# Patient Record
Sex: FEMALE | Race: BLACK OR AFRICAN AMERICAN | Employment: UNEMPLOYED | URBAN - METROPOLITAN AREA
[De-identification: names, ages, dates, MRNs, and addresses within clinical notes are randomized per-mention and may not be internally consistent; named-entity substitution may affect disease eponyms.]

---

## 2018-11-15 ENCOUNTER — APPOINTMENT (OUTPATIENT)
Dept: ULTRASOUND IMAGING | Age: 63
DRG: 045 | End: 2018-11-15
Attending: INTERNAL MEDICINE
Payer: COMMERCIAL

## 2018-11-15 ENCOUNTER — HOSPITAL ENCOUNTER (INPATIENT)
Age: 63
LOS: 6 days | Discharge: HOME HEALTH | DRG: 045 | End: 2018-11-21
Attending: EMERGENCY MEDICINE | Admitting: INTERNAL MEDICINE
Payer: COMMERCIAL

## 2018-11-15 ENCOUNTER — APPOINTMENT (OUTPATIENT)
Dept: GENERAL RADIOLOGY | Age: 63
DRG: 045 | End: 2018-11-15
Attending: INTERNAL MEDICINE
Payer: COMMERCIAL

## 2018-11-15 ENCOUNTER — APPOINTMENT (OUTPATIENT)
Dept: CT IMAGING | Age: 63
DRG: 045 | End: 2018-11-15
Attending: EMERGENCY MEDICINE
Payer: COMMERCIAL

## 2018-11-15 ENCOUNTER — APPOINTMENT (OUTPATIENT)
Dept: MRI IMAGING | Age: 63
DRG: 045 | End: 2018-11-15
Attending: EMERGENCY MEDICINE
Payer: COMMERCIAL

## 2018-11-15 DIAGNOSIS — Z72.0 TOBACCO USE: Chronic | ICD-10-CM

## 2018-11-15 DIAGNOSIS — I63.9 CEREBROVASCULAR ACCIDENT (CVA), UNSPECIFIED MECHANISM (HCC): Primary | ICD-10-CM

## 2018-11-15 DIAGNOSIS — I10 ESSENTIAL HYPERTENSION: Chronic | ICD-10-CM

## 2018-11-15 PROBLEM — D72.819 LEUKOPENIA: Status: ACTIVE | Noted: 2018-11-15

## 2018-11-15 PROBLEM — E87.1 HYPONATREMIA: Status: ACTIVE | Noted: 2018-11-15

## 2018-11-15 LAB
ALBUMIN SERPL-MCNC: 3.7 G/DL (ref 3.2–4.6)
ALBUMIN/GLOB SERPL: 0.8 {RATIO} (ref 1.2–3.5)
ALP SERPL-CCNC: 118 U/L (ref 50–136)
ALT SERPL-CCNC: 23 U/L (ref 12–65)
ANION GAP SERPL CALC-SCNC: 8 MMOL/L (ref 7–16)
AST SERPL-CCNC: 17 U/L (ref 15–37)
ATRIAL RATE: 84 BPM
BASOPHILS # BLD: 0 K/UL (ref 0–0.2)
BASOPHILS NFR BLD: 1 % (ref 0–2)
BILIRUB SERPL-MCNC: 0.6 MG/DL (ref 0.2–1.1)
BUN SERPL-MCNC: 18 MG/DL (ref 8–23)
CALCIUM SERPL-MCNC: 9 MG/DL (ref 8.3–10.4)
CALCULATED P AXIS, ECG09: 65 DEGREES
CALCULATED R AXIS, ECG10: -19 DEGREES
CALCULATED T AXIS, ECG11: 53 DEGREES
CHLORIDE SERPL-SCNC: 100 MMOL/L (ref 98–107)
CO2 SERPL-SCNC: 23 MMOL/L (ref 21–32)
CREAT SERPL-MCNC: 1.04 MG/DL (ref 0.6–1)
DIAGNOSIS, 93000: NORMAL
DIFFERENTIAL METHOD BLD: ABNORMAL
EOSINOPHIL # BLD: 0.1 K/UL (ref 0–0.8)
EOSINOPHIL NFR BLD: 3 % (ref 0.5–7.8)
ERYTHROCYTE [DISTWIDTH] IN BLOOD BY AUTOMATED COUNT: 13.4 %
GLOBULIN SER CALC-MCNC: 4.4 G/DL (ref 2.3–3.5)
GLUCOSE SERPL-MCNC: 98 MG/DL (ref 65–100)
HCT VFR BLD AUTO: 42.6 % (ref 35.8–46.3)
HGB BLD-MCNC: 13.8 G/DL (ref 11.7–15.4)
IMM GRANULOCYTES # BLD: 0 K/UL (ref 0–0.5)
IMM GRANULOCYTES NFR BLD AUTO: 1 % (ref 0–5)
LYMPHOCYTES # BLD: 0.9 K/UL (ref 0.5–4.6)
LYMPHOCYTES NFR BLD: 23 % (ref 13–44)
MCH RBC QN AUTO: 30 PG (ref 26.1–32.9)
MCHC RBC AUTO-ENTMCNC: 32.4 G/DL (ref 31.4–35)
MCV RBC AUTO: 92.6 FL (ref 79.6–97.8)
MONOCYTES # BLD: 0.5 K/UL (ref 0.1–1.3)
MONOCYTES NFR BLD: 14 % (ref 4–12)
NEUTS SEG # BLD: 2.2 K/UL (ref 1.7–8.2)
NEUTS SEG NFR BLD: 58 % (ref 43–78)
NRBC # BLD: 0 K/UL (ref 0–0.2)
P-R INTERVAL, ECG05: 136 MS
PLATELET # BLD AUTO: 292 K/UL (ref 150–450)
PMV BLD AUTO: 9.2 FL (ref 9.4–12.3)
POTASSIUM SERPL-SCNC: 3.8 MMOL/L (ref 3.5–5.1)
PROT SERPL-MCNC: 8.1 G/DL (ref 6.3–8.2)
Q-T INTERVAL, ECG07: 398 MS
QRS DURATION, ECG06: 70 MS
QTC CALCULATION (BEZET), ECG08: 470 MS
RBC # BLD AUTO: 4.6 M/UL (ref 4.05–5.2)
SODIUM SERPL-SCNC: 131 MMOL/L (ref 136–145)
VENTRICULAR RATE, ECG03: 84 BPM
WBC # BLD AUTO: 3.7 K/UL (ref 4.3–11.1)

## 2018-11-15 PROCEDURE — 74011250636 HC RX REV CODE- 250/636: Performed by: INTERNAL MEDICINE

## 2018-11-15 PROCEDURE — 70551 MRI BRAIN STEM W/O DYE: CPT

## 2018-11-15 PROCEDURE — 80053 COMPREHEN METABOLIC PANEL: CPT

## 2018-11-15 PROCEDURE — 93880 EXTRACRANIAL BILAT STUDY: CPT

## 2018-11-15 PROCEDURE — 74011250637 HC RX REV CODE- 250/637: Performed by: INTERNAL MEDICINE

## 2018-11-15 PROCEDURE — 70450 CT HEAD/BRAIN W/O DYE: CPT

## 2018-11-15 PROCEDURE — 65660000000 HC RM CCU STEPDOWN

## 2018-11-15 PROCEDURE — 85025 COMPLETE CBC W/AUTO DIFF WBC: CPT

## 2018-11-15 PROCEDURE — 71045 X-RAY EXAM CHEST 1 VIEW: CPT

## 2018-11-15 PROCEDURE — 99284 EMERGENCY DEPT VISIT MOD MDM: CPT | Performed by: EMERGENCY MEDICINE

## 2018-11-15 PROCEDURE — 93005 ELECTROCARDIOGRAM TRACING: CPT | Performed by: EMERGENCY MEDICINE

## 2018-11-15 RX ORDER — SODIUM CHLORIDE 0.9 % (FLUSH) 0.9 %
5-10 SYRINGE (ML) INJECTION EVERY 8 HOURS
Status: DISCONTINUED | OUTPATIENT
Start: 2018-11-15 | End: 2018-11-21 | Stop reason: HOSPADM

## 2018-11-15 RX ORDER — HYDRALAZINE HYDROCHLORIDE 20 MG/ML
10 INJECTION INTRAMUSCULAR; INTRAVENOUS
Status: DISCONTINUED | OUTPATIENT
Start: 2018-11-15 | End: 2018-11-21 | Stop reason: HOSPADM

## 2018-11-15 RX ORDER — IBUPROFEN 200 MG
1 TABLET ORAL DAILY
Status: DISCONTINUED | OUTPATIENT
Start: 2018-11-15 | End: 2018-11-21 | Stop reason: HOSPADM

## 2018-11-15 RX ORDER — ACETAMINOPHEN 325 MG/1
650 TABLET ORAL
Status: DISCONTINUED | OUTPATIENT
Start: 2018-11-15 | End: 2018-11-21 | Stop reason: HOSPADM

## 2018-11-15 RX ORDER — ALBUTEROL SULFATE 0.83 MG/ML
2.5 SOLUTION RESPIRATORY (INHALATION)
Status: DISCONTINUED | OUTPATIENT
Start: 2018-11-15 | End: 2018-11-18

## 2018-11-15 RX ORDER — ENOXAPARIN SODIUM 100 MG/ML
40 INJECTION SUBCUTANEOUS EVERY 24 HOURS
Status: DISCONTINUED | OUTPATIENT
Start: 2018-11-16 | End: 2018-11-21 | Stop reason: HOSPADM

## 2018-11-15 RX ORDER — SODIUM CHLORIDE 0.9 % (FLUSH) 0.9 %
5-10 SYRINGE (ML) INJECTION AS NEEDED
Status: DISCONTINUED | OUTPATIENT
Start: 2018-11-15 | End: 2018-11-21 | Stop reason: HOSPADM

## 2018-11-15 RX ORDER — GUAIFENESIN 100 MG/5ML
81 LIQUID (ML) ORAL DAILY
Status: DISCONTINUED | OUTPATIENT
Start: 2018-11-16 | End: 2018-11-21 | Stop reason: HOSPADM

## 2018-11-15 RX ORDER — SODIUM CHLORIDE 9 MG/ML
75 INJECTION, SOLUTION INTRAVENOUS CONTINUOUS
Status: DISCONTINUED | OUTPATIENT
Start: 2018-11-15 | End: 2018-11-18

## 2018-11-15 RX ORDER — ATORVASTATIN CALCIUM 40 MG/1
40 TABLET, FILM COATED ORAL
Status: DISCONTINUED | OUTPATIENT
Start: 2018-11-15 | End: 2018-11-21 | Stop reason: HOSPADM

## 2018-11-15 RX ORDER — ONDANSETRON 2 MG/ML
4 INJECTION INTRAMUSCULAR; INTRAVENOUS
Status: DISCONTINUED | OUTPATIENT
Start: 2018-11-15 | End: 2018-11-21 | Stop reason: HOSPADM

## 2018-11-15 RX ADMIN — HYDRALAZINE HYDROCHLORIDE 10 MG: 20 INJECTION INTRAMUSCULAR; INTRAVENOUS at 21:31

## 2018-11-15 NOTE — ED PROVIDER NOTES
Patient last at her normal baseline on Monday of this week. Since that time she has had difficulty walking and has weakness to the right side. No history of associated speech or vision change. No headache. Had a fall after these changes on yesterday. No fall related injury. Patient continues to feel knee issue is \"gout\" but no pain to move/swelling or soreness on palpation /motion 
 
 
*patient has learning disabilities and history is somewhat compromised by this. The history is provided by the patient and a relative. Knee Problem This is a new problem. The pain is present in the right knee. Pertinent negatives include no numbness, full range of motion and no tingling. Extremity Weakness This is a new problem. The current episode started 2 days ago. The problem occurs constantly. The problem has not changed since onset. Pertinent negatives include no numbness, full range of motion and no tingling. She has tried nothing for the symptoms. There has been no history of extremity trauma. No past medical history on file. No past surgical history on file. No family history on file. Social History Socioeconomic History  Marital status: SINGLE Spouse name: Not on file  Number of children: Not on file  Years of education: Not on file  Highest education level: Not on file Social Needs  Financial resource strain: Not on file  Food insecurity - worry: Not on file  Food insecurity - inability: Not on file  Transportation needs - medical: Not on file  Transportation needs - non-medical: Not on file Occupational History  Not on file Tobacco Use  Smoking status: Not on file Substance and Sexual Activity  Alcohol use: Not on file  Drug use: Not on file  Sexual activity: Not on file Other Topics Concern  Not on file Social History Narrative  Not on file ALLERGIES: Patient has no known allergies. Review of Systems Constitutional: Negative for chills and fever. HENT: Negative. Eyes: Negative for visual disturbance. Respiratory: Negative. Cardiovascular: Negative. Genitourinary: Negative. Neurological: Positive for weakness. Negative for tingling, speech difficulty and numbness. All other systems reviewed and are negative. Vitals:  
 11/15/18 1040 BP: (!) 160/102 Pulse: 93 Resp: 18 Temp: 97.8 °F (36.6 °C) SpO2: 98% Physical Exam  
Constitutional: She appears well-developed and well-nourished. No distress. HENT:  
Head: Atraumatic. Right Ear: External ear normal.  
Left Ear: External ear normal.  
Nose: Nose normal.  
Mouth/Throat: Oropharynx is clear and moist.  
Slight probable right facial weakness Eyes: EOM are normal. Pupils are equal, round, and reactive to light. No scleral icterus. Neck: Neck supple. Cardiovascular: Normal rate. Pulmonary/Chest: Effort normal. No respiratory distress. Abdominal: Soft. Musculoskeletal: Normal range of motion. Neurological: No sensory deficit. She exhibits abnormal muscle tone. Right arm drift Some right facial weakness Skin: Skin is warm and dry. No erythema. No pallor. Psychiatric: She has a normal mood and affect. Thought content normal.  
Nursing note and vitals reviewed. MDM Number of Diagnoses or Management Options Diagnosis management comments: MRI identified small right parietal infarct. Patient difficult historian and issues at least 2 days per family who give additional hx Amount and/or Complexity of Data Reviewed Clinical lab tests: ordered and reviewed Tests in the radiology section of CPT®: reviewed and ordered Obtain history from someone other than the patient: yes Discuss the patient with other providers: yes Independent visualization of images, tracings, or specimens: yes Risk of Complications, Morbidity, and/or Mortality Presenting problems: high Diagnostic procedures: low Management options: moderate Patient Progress Patient progress: stable Procedures Study Result MRI of the brain  
  
INDICATION:  Right arm and hand weakness 
  
Standard MRI sequences were obtained through the brain in multiple planes 
without IV contrast 
  
FINDINGS: 
There is a small acute lacunar infarcts in the left corona radiata. There is no 
associated hemorrhage. No other areas of acute infarction are seen. There is 
an old lacunar infarct in the right midbrain. There is moderate chronic change 
in the cerebellum and periventricular white matter. .  The ventricles are normal 
in size. There are no extra-axial fluid collections. There are no intracranial 
masses. There are no bony lesions. The sinuses are clear. 
  
IMPRESSION IMPRESSION: Small acute left parietal white matter infarct. No evidence of 
hemorrhage. 
   
 
MRI BRAIN WO CONT Final Result IMPRESSION: Small acute left parietal white matter infarct. No evidence of  
hemorrhage. CT HEAD WO CONT Final Result IMPRESSION:  Negative for acute intracranial hemorrhage. Chronic changes. MRI  
is more sensitive for acute infarct. Recent Results (from the past 12 hour(s)) CBC WITH AUTOMATED DIFF Collection Time: 11/15/18 12:06 PM  
Result Value Ref Range WBC 3.7 (L) 4.3 - 11.1 K/uL  
 RBC 4.60 4.05 - 5.2 M/uL  
 HGB 13.8 11.7 - 15.4 g/dL HCT 42.6 35.8 - 46.3 % MCV 92.6 79.6 - 97.8 FL  
 MCH 30.0 26.1 - 32.9 PG  
 MCHC 32.4 31.4 - 35.0 g/dL  
 RDW 13.4 % PLATELET 367 625 - 848 K/uL MPV 9.2 (L) 9.4 - 12.3 FL ABSOLUTE NRBC 0.00 0.0 - 0.2 K/uL  
 DF AUTOMATED NEUTROPHILS 58 43 - 78 % LYMPHOCYTES 23 13 - 44 % MONOCYTES 14 (H) 4.0 - 12.0 % EOSINOPHILS 3 0.5 - 7.8 % BASOPHILS 1 0.0 - 2.0 % IMMATURE GRANULOCYTES 1 0.0 - 5.0 %  
 ABS. NEUTROPHILS 2.2 1.7 - 8.2 K/UL  
 ABS. LYMPHOCYTES 0.9 0.5 - 4.6 K/UL  
 ABS. MONOCYTES 0.5 0.1 - 1.3 K/UL ABS. EOSINOPHILS 0.1 0.0 - 0.8 K/UL  
 ABS. BASOPHILS 0.0 0.0 - 0.2 K/UL  
 ABS. IMM. GRANS. 0.0 0.0 - 0.5 K/UL METABOLIC PANEL, COMPREHENSIVE Collection Time: 11/15/18 12:06 PM  
Result Value Ref Range Sodium 131 (L) 136 - 145 mmol/L Potassium 3.8 3.5 - 5.1 mmol/L Chloride 100 98 - 107 mmol/L  
 CO2 23 21 - 32 mmol/L Anion gap 8 7 - 16 mmol/L Glucose 98 65 - 100 mg/dL BUN 18 8 - 23 MG/DL Creatinine 1.04 (H) 0.6 - 1.0 MG/DL  
 GFR est AA >60 >60 ml/min/1.73m2 GFR est non-AA 57 (L) >60 ml/min/1.73m2 Calcium 9.0 8.3 - 10.4 MG/DL Bilirubin, total 0.6 0.2 - 1.1 MG/DL  
 ALT (SGPT) 23 12 - 65 U/L  
 AST (SGOT) 17 15 - 37 U/L Alk. phosphatase 118 50 - 136 U/L Protein, total 8.1 6.3 - 8.2 g/dL Albumin 3.7 3.2 - 4.6 g/dL Globulin 4.4 (H) 2.3 - 3.5 g/dL A-G Ratio 0.8 (L) 1.2 - 3.5 EKG, 12 LEAD, INITIAL Collection Time: 11/15/18 12:11 PM  
Result Value Ref Range Ventricular Rate 84 BPM  
 Atrial Rate 84 BPM  
 P-R Interval 136 ms QRS Duration 70 ms Q-T Interval 398 ms QTC Calculation (Bezet) 470 ms Calculated P Axis 65 degrees Calculated R Axis -19 degrees Calculated T Axis 53 degrees Diagnosis Normal sinus rhythm Possible Left atrial enlargement Anterior infarct , age undetermined Abnormal ECG No previous ECGs available Confirmed by Salinas Schulz MD (), Ana Luisa Anthony ([id number]) on 11/15/2018 4:54:32 PM

## 2018-11-15 NOTE — ED TRIAGE NOTES
Pt arrives to the ER stating right knee troubles for 3 days. Pt is able to walk on it during the day but states harder to move at night and this morning. Pt denies pain. No hx of gout, no injury or trauma. Pt able to move all extremities fine in triage

## 2018-11-15 NOTE — ED NOTES
Dr. Willian Polanco to triage to evaluate pt. No orders at this time, to be evaluated once pulled back to a room.

## 2018-11-16 LAB
ANION GAP SERPL CALC-SCNC: 8 MMOL/L (ref 7–16)
BASOPHILS # BLD: 0 K/UL (ref 0–0.2)
BASOPHILS NFR BLD: 1 % (ref 0–2)
BUN SERPL-MCNC: 19 MG/DL (ref 8–23)
CALCIUM SERPL-MCNC: 8.7 MG/DL (ref 8.3–10.4)
CHLORIDE SERPL-SCNC: 102 MMOL/L (ref 98–107)
CHOLEST SERPL-MCNC: 211 MG/DL
CO2 SERPL-SCNC: 24 MMOL/L (ref 21–32)
CREAT SERPL-MCNC: 0.86 MG/DL (ref 0.6–1)
DIFFERENTIAL METHOD BLD: NORMAL
EOSINOPHIL # BLD: 0 K/UL (ref 0–0.8)
EOSINOPHIL NFR BLD: 1 % (ref 0.5–7.8)
ERYTHROCYTE [DISTWIDTH] IN BLOOD BY AUTOMATED COUNT: 13.3 %
EST. AVERAGE GLUCOSE BLD GHB EST-MCNC: 114 MG/DL
GLUCOSE SERPL-MCNC: 97 MG/DL (ref 65–100)
HBA1C MFR BLD: 5.6 % (ref 4.8–6)
HCT VFR BLD AUTO: 37.7 % (ref 35.8–46.3)
HDLC SERPL-MCNC: 60 MG/DL (ref 40–60)
HDLC SERPL: 3.5 {RATIO}
HGB BLD-MCNC: 12.7 G/DL (ref 11.7–15.4)
IMM GRANULOCYTES # BLD: 0 K/UL (ref 0–0.5)
IMM GRANULOCYTES NFR BLD AUTO: 0 % (ref 0–5)
LDLC SERPL CALC-MCNC: 141.6 MG/DL
LIPID PROFILE,FLP: ABNORMAL
LYMPHOCYTES # BLD: 1 K/UL (ref 0.5–4.6)
LYMPHOCYTES NFR BLD: 18 % (ref 13–44)
MCH RBC QN AUTO: 30.2 PG (ref 26.1–32.9)
MCHC RBC AUTO-ENTMCNC: 33.7 G/DL (ref 31.4–35)
MCV RBC AUTO: 89.8 FL (ref 79.6–97.8)
MONOCYTES # BLD: 0.6 K/UL (ref 0.1–1.3)
MONOCYTES NFR BLD: 11 % (ref 4–12)
NEUTS SEG # BLD: 3.6 K/UL (ref 1.7–8.2)
NEUTS SEG NFR BLD: 69 % (ref 43–78)
NRBC # BLD: 0 K/UL (ref 0–0.2)
PLATELET # BLD AUTO: 310 K/UL (ref 150–450)
PMV BLD AUTO: 9.6 FL (ref 9.4–12.3)
POTASSIUM SERPL-SCNC: 3.3 MMOL/L (ref 3.5–5.1)
RBC # BLD AUTO: 4.2 M/UL (ref 4.05–5.2)
SODIUM SERPL-SCNC: 134 MMOL/L (ref 136–145)
TRIGL SERPL-MCNC: 47 MG/DL (ref 35–150)
VLDLC SERPL CALC-MCNC: 9.4 MG/DL (ref 6–23)
WBC # BLD AUTO: 5.3 K/UL (ref 4.3–11.1)

## 2018-11-16 PROCEDURE — 77030020263 HC SOL INJ SOD CL0.9% LFCR 1000ML

## 2018-11-16 PROCEDURE — 74011250637 HC RX REV CODE- 250/637: Performed by: INTERNAL MEDICINE

## 2018-11-16 PROCEDURE — 36415 COLL VENOUS BLD VENIPUNCTURE: CPT

## 2018-11-16 PROCEDURE — 92610 EVALUATE SWALLOWING FUNCTION: CPT

## 2018-11-16 PROCEDURE — 80048 BASIC METABOLIC PNL TOTAL CA: CPT

## 2018-11-16 PROCEDURE — 97162 PT EVAL MOD COMPLEX 30 MIN: CPT

## 2018-11-16 PROCEDURE — 83036 HEMOGLOBIN GLYCOSYLATED A1C: CPT

## 2018-11-16 PROCEDURE — 74011250636 HC RX REV CODE- 250/636: Performed by: INTERNAL MEDICINE

## 2018-11-16 PROCEDURE — 65660000000 HC RM CCU STEPDOWN

## 2018-11-16 PROCEDURE — 86580 TB INTRADERMAL TEST: CPT | Performed by: INTERNAL MEDICINE

## 2018-11-16 PROCEDURE — 85025 COMPLETE CBC W/AUTO DIFF WBC: CPT

## 2018-11-16 PROCEDURE — 97530 THERAPEUTIC ACTIVITIES: CPT

## 2018-11-16 PROCEDURE — C8929 TTE W OR WO FOL WCON,DOPPLER: HCPCS

## 2018-11-16 PROCEDURE — 97165 OT EVAL LOW COMPLEX 30 MIN: CPT

## 2018-11-16 PROCEDURE — 99253 IP/OBS CNSLTJ NEW/EST LOW 45: CPT | Performed by: PHYSICAL MEDICINE & REHABILITATION

## 2018-11-16 PROCEDURE — 74011000302 HC RX REV CODE- 302: Performed by: INTERNAL MEDICINE

## 2018-11-16 PROCEDURE — 74011000250 HC RX REV CODE- 250: Performed by: INTERNAL MEDICINE

## 2018-11-16 PROCEDURE — 80061 LIPID PANEL: CPT

## 2018-11-16 PROCEDURE — 97161 PT EVAL LOW COMPLEX 20 MIN: CPT

## 2018-11-16 RX ORDER — AMLODIPINE BESYLATE 5 MG/1
5 TABLET ORAL DAILY
Status: DISCONTINUED | OUTPATIENT
Start: 2018-11-17 | End: 2018-11-18

## 2018-11-16 RX ORDER — POTASSIUM CHLORIDE 1.5 G/1.77G
40 POWDER, FOR SOLUTION ORAL ONCE
Status: COMPLETED | OUTPATIENT
Start: 2018-11-16 | End: 2018-11-16

## 2018-11-16 RX ADMIN — POTASSIUM CHLORIDE 40 MEQ: 1.5 POWDER, FOR SOLUTION ORAL at 17:04

## 2018-11-16 RX ADMIN — HYDRALAZINE HYDROCHLORIDE 10 MG: 20 INJECTION INTRAMUSCULAR; INTRAVENOUS at 20:52

## 2018-11-16 RX ADMIN — HYDRALAZINE HYDROCHLORIDE 10 MG: 20 INJECTION INTRAMUSCULAR; INTRAVENOUS at 08:25

## 2018-11-16 RX ADMIN — ATORVASTATIN CALCIUM 40 MG: 40 TABLET, FILM COATED ORAL at 00:35

## 2018-11-16 RX ADMIN — ATORVASTATIN CALCIUM 40 MG: 40 TABLET, FILM COATED ORAL at 20:51

## 2018-11-16 RX ADMIN — Medication 10 ML: at 20:54

## 2018-11-16 RX ADMIN — PERFLUTREN 1 ML: 6.52 INJECTION, SUSPENSION INTRAVENOUS at 10:00

## 2018-11-16 RX ADMIN — Medication 5 ML: at 00:40

## 2018-11-16 RX ADMIN — ACETAMINOPHEN 650 MG: 325 TABLET, FILM COATED ORAL at 20:51

## 2018-11-16 RX ADMIN — ENOXAPARIN SODIUM 40 MG: 40 INJECTION, SOLUTION INTRAVENOUS; SUBCUTANEOUS at 08:25

## 2018-11-16 RX ADMIN — SODIUM CHLORIDE 75 ML/HR: 900 INJECTION, SOLUTION INTRAVENOUS at 00:36

## 2018-11-16 RX ADMIN — Medication 10 ML: at 14:00

## 2018-11-16 RX ADMIN — ASPIRIN 81 MG 81 MG: 81 TABLET ORAL at 08:12

## 2018-11-16 RX ADMIN — TUBERCULIN PURIFIED PROTEIN DERIVATIVE 5 UNITS: 5 INJECTION, SOLUTION INTRADERMAL at 00:46

## 2018-11-16 NOTE — ED NOTES
Report received from [facility] to assume patient care at this time. Patient is in MRI At this time

## 2018-11-16 NOTE — PROGRESS NOTES
Problem: Self Care Deficits Care Plan (Adult) Goal: *Acute Goals and Plan of Care (Insert Text) 1. Patient will feed self entire meal with setup and adaptive utensils as needed. 2. Patient will complete total body dressing and bathing with minimal assistance and adaptive equipment as needed. 3. Patient will participate in BUE therapeutic exercises to increase strength in RUE to at least 2/5 for participation in ADLs and functional transfers. 4. Patient will participate in [address] [place] [address] therapeutic activities to increase coordination in RUE to [facility] for bimanual fine motor ADLs. 5. Patient will attend to right side for 100% of treatment session with no verbal cues from therapist.  
6. Patient will attempt standing in preparation for functional transfers. Timeframe: 7 visits OCCUPATIONAL THERAPY: Initial Assessment, Daily Note and Treatment Day: 1st 11/16/2018INPATIENT: Hospital Day: 2 Payor: Janelle / Plan: [address] / Product Type: Managed Care Medicaid /  
  
NAME/AGE/GENDER: Uvaldo Mahajan is a 61 y.o. female PRIMARY DIAGNOSIS:  CVA (cerebral vascular accident) [facility]) CVA (cerebral vascular accident) ([facility] [address] CVA (cerebral vascular accident) ([facility] [address] ICD-10: Treatment Diagnosis:  
 · Generalized Muscle Weakness (M62.81) · Other lack of cordination (R27.8) Precautions/Allergies: 
   Patient has no known allergies. ASSESSMENT:  
Ms. Loren Camejo is a 61year old female admitted with R sided weakness, MRI showed L parietal CVA. At baseline patient lives with her aunt and is typically independent with ADLs. Patient supine in bed upon arrival, agreeable to OT evaluation. Reports no pain. BUE assessment reveals LUE WNL for ROM and strength 5/5. RUE 0/5 strength although sensation appears intact to light touch and patient does have awareness of RUE. Unfortunately she is R hand dominant.  Treatment initiated to include rolling in supine with maximal assistance R<-->L and supine to sit with maximal assistance x2. Worked on sitting balance and holding body and neck at midline. Did not attempt standing this date due to RLE weakness. Patient able to scoot self in bed using primarily L strong side. Instructed patient on proper positioning of RUE to prevent future complications. She is currently functioning below her independent baseline and would benefit from continued occupational therapy to increase independence and safety. Will follow. This section established at most recent assessment PROBLEM LIST (Impairments causing functional limitations): 1. Decreased Strength 2. Decreased ADL/Functional Activities 3. Decreased Transfer Abilities 4. Decreased Ambulation Ability/Technique 5. Decreased Balance 6. Decreased Activity Tolerance INTERVENTIONS PLANNED: (Benefits and precautions of occupational therapy have been discussed with the patient.) 1. Activities of daily living training 2. Adaptive equipment training 3. Donning&doffing training 4. Group therapy 5. Deon tech training 6. Neuromuscular re-eduation 7. Therapeutic activity 8. Therapeutic exercise TREATMENT PLAN: Frequency/Duration: Follow patient 3x/ week to address above goals. Rehabilitation Potential For Stated Goals: Good RECOMMENDED REHABILITATION/EQUIPMENT: (at time of discharge pending progress): Due to the probability of continued deficits (see above) this patient will likely need continued skilled occupational therapy after discharge. Equipment: ? TBD  
    
 
 
 
OCCUPATIONAL PROFILE AND HISTORY:  
History of Present Injury/Illness (Reason for Referral): 
Per H&P,   
Ms. Ginny Almazan is a 60 yo female with PMH of HTN, tobacco use who is evaluated with complaints of right sided weakness. She feels dizzy with ambulation and has had several falls. Stopped antihypertensives per PCP. Admits to ongoing smoking. Denies speech changes. CT head negative but MRI brain shows left parietal CVA. Past Medical History/Comorbidities: Ms. Blondie Schilder  has no past medical history on file. Ms. Blondie Schilder  has no past surgical history on file. Social History/Living Environment:  
Home Environment: Private residence # Steps to Enter: 2 One/Two Story Residence: One story Living Alone: No 
Support Systems: Friends \ neighbors, Family member(s) Patient Expects to be Discharged to[de-identified] Private residence Current DME Used/Available at Home: Cane, straight Prior Level of Function/Work/Activity: 
Lives with aunt. Independent with ADLs. Dominant Side:  
      RIGHT Number of Personal Factors/Comorbidities that affect the Plan of Care: Brief history (0):  LOW COMPLEXITY ASSESSMENT OF OCCUPATIONAL PERFORMANCE[de-identified]  
Activities of Daily Living:  
Basic ADLs (From Assessment) Complex ADLs (From Assessment) Feeding: Moderate assistance Oral Facial Hygiene/Grooming: Moderate assistance Bathing: Moderate assistance Upper Body Dressing: Moderate assistance Lower Body Dressing: Maximum assistance Toileting: Moderate assistance Instrumental ADL Meal Preparation: Total assistance Homemaking: Total assistance Medication Management: Total assistance Financial Management: Total assistance Grooming/Bathing/Dressing Activities of Daily Living Cognitive Retraining Safety/Judgement: Fall prevention Toileting Toileting Assistance: Total assistance(dependent) Bed/Mat Mobility Rolling: Maximum assistance Supine to Sit: Maximum assistance;Assist x2 Sit to Supine: Maximum assistance;Assist x2 Sit to Stand: (NT) Scooting: Maximum assistance Most Recent Physical Functioning:  
Gross Assessment: 
AROM: Grossly decreased, non-functional(RUE) PROM: Within functional limits(BUE) Strength: Grossly decreased, non-functional(RUE 0/5, LUE 5/5) Coordination: Grossly decreased, non-functional(RUE ) Tone: Abnormal(RUE) Sensation: Intact(light touch and deep pressure BUE) Posture: 
  
Balance: 
Sitting: Impaired Sitting - Static: Poor (constant support) Sitting - Dynamic: Poor (constant support) Bed Mobility: 
Rolling: Maximum assistance Supine to Sit: Maximum assistance;Assist x2 Sit to Supine: Maximum assistance;Assist x2 Scooting: Maximum assistance Wheelchair Mobility: 
  
Transfers: 
Sit to Stand: (NT) Patient Vitals for the past 6 hrs: 
 BP BP Patient Position SpO2 Pulse 11/16/18 0800 (!) 176/108 At rest 91 % (!) 105 Mental Status Neurologic State: Alert Orientation Level: Oriented to person Cognition: Follows commands Perception: Cues to attend right visual field, Cues to attend to right side of body, Cues to maintain midline in sitting Perseveration: No perseveration noted Safety/Judgement: Fall prevention Physical Skills Involved: 1. Range of Motion 2. Balance 3. Strength 4. Activity Tolerance 5. Sensation 6. Fine Motor Control 7. Gross Motor Control Cognitive Skills Affected (resulting in the inability to perform in a timely and safe manner): 1. Expression Psychosocial Skills Affected: 1. Habits/Routines 2. Environmental Adaptation 3. Self-Awareness 4. Social Roles Number of elements that affect the Plan of Care: 5+:  HIGH COMPLEXITY CLINICAL DECISION MAKING:  
[facility] 6 Clicks Daily Activity Inpatient Short Form How much help from another person does the patient currently need. .. Total A Lot A Little None 1. Putting on and taking off regular lower body clothing? [] 1   [x] 2   [] 3   [] 4  
2. Bathing (including washing, rinsing, drying)? [] 1   [x] 2   [] 3   [] 4  
3. Toileting, which includes using toilet, bedpan or urinal?   [] 1   [x] 2   [] 3   [] 4  
4. Putting on and taking off regular upper body clothing?    [] 1   [x] 2 [] 3   [] 4  
5. Taking care of personal grooming such as brushing teeth? [] 1   [x] 2   [] 3   [] 4  
6. Eating meals? [] 1   [x] 2   [] 3   [] 4  
© 2007, Trustees of [facility], under license to [employer]. All rights reserved Score:  12 Most Recent: X (Date: -- ) Interpretation of Tool:  Represents activities that are increasingly more difficult (i.e. Bed mobility, Transfers, Gait). Score 24 23 22-20 19-15 14-10 9-7 6 Modifier CH CI CJ CK CL CM CN   
 
? Self Care:  
  - CURRENT STATUS: CL - 60%-79% impaired, limited or restricted  - GOAL STATUS: CK - 40%-59% impaired, limited or restricted  - D/C STATUS:  ---------------To be determined--------------- Payor: Janelle / Plan: [facility] / Product Type: Managed Care Medicaid /   
 
Medical NecessiInitial: ty:    
· Patient demonstrates good rehab potential due to higher previous functional level. Reason for Services/Other Comments: 
· Patient continues to require present interventions due to patient's inability to use dominant RUE for functional tasks. Use of outcome tool(s) and clinical judgement create a POC that gives a: MODERATE COMPLEXITY  
 
 
 
TREATMENT:  
(In addition to Assessment/Re-Assessment sessions the following treatments were rendered) Pre-treatment Symptoms/Complaints:   
Pain: Initial:  
Pain Intensity 1: 0  Post Session:  None Therapeutic Activity: (    8 minutes): Therapeutic activities including Bed transfers and rolling in supine and sitting edge of bed  to improve mobility, balance and proprioception. Required maximal   to promote dynamic balance in sitting. Braces/Orthotics/Lines/Etc:  
· IV 
· O2 Device: Room air Treatment/Session Assessment:   
· Response to Treatment:  Tolerated well · Interdisciplinary Collaboration:  
o Occupational Therapist 
o Registered Nurse · After treatment position/precautions:  
o Supine in bed o Bed alarm/tab alert on 
o Bed/Chair-wheels locked 
o Call light within reach 
o RN notified 
o Family at bedside 
o MD at bedside · Compliance with Program/Exercises: Compliant all of the time. · Recommendations/Intent for next treatment session: \"Next visit will focus on advancements to more challenging activities and reduction in assistance provided\". Total Treatment Duration: OT Patient Time In/Time Out Time In: 1017 Time Out: 2456 Napoleon P Zack OTR/L

## 2018-11-16 NOTE — PROGRESS NOTES
Am assessment completed, slurred speech noted and right side flaccid. No swallowing difficulty noted. Niece at bedside visiting and reports she is primary contact for patient

## 2018-11-16 NOTE — PROGRESS NOTES
Problem: Interdisciplinary Rounds Goal: Interdisciplinary Rounds Outcome: Progressing Towards Goal 
Interdisciplinary team rounds were held 11/16/2018 with the following team members:Care Management, Nurse Practitioner, Physical Therapy and . Referral to [facility]. Plan of care discussed. See clinical pathway and/or care plan for interventions and desired outcomes.

## 2018-11-16 NOTE — PROGRESS NOTES
Incontinent care provided, patient still has slurred speech, alert and oriented x4. No acute distress noted. VSS,right side is still flaccid. HOB 30 degrees, will cont to [place]

## 2018-11-16 NOTE — PROGRESS NOTES
Problem: Mobility Impaired (Adult and Pediatric) Goal: *Acute Goals and Plan of Care (Insert Text) STG: 
(1.)Ms. Blondie Schilder will move from supine to sit and sit to supine , scoot up and down and roll side to side with MODERATE ASSIST within 3 treatment day(s). (2.)Ms. Blondie Schilder will transfer from bed to chair and chair to bed with MAXIMAL ASSIST using the least restrictive device within 3 treatment day(s). (3.)Ms. Blondie Schilder will ambulate with MAXIMAL ASSIST for 10 feet with the least restrictive device within 3 treatment day(s). (4.)Ms. Blondie Schilder will maintain static/dynamic sitting x 12 minutes with at least FAIR balance for improved safety within 3 days. LTG: 
(1.)Ms. Blondie Schilder will move from supine to sit and sit to supine , scoot up and down and roll side to side in bed with MINIMAL ASSIST within 7 treatment day(s). (2.)Ms. Blondie Schilder will transfer from bed to chair and chair to bed with MINIMAL ASSIST using the least restrictive device within 7 treatment day(s). (3.)Ms. Blondie Schilder will ambulate with MINIMAL ASSIST for 50+ feet with the least restrictive device within 7 treatment day(s). (4.)Ms. Blondie Schilder will maintain static/dynamic sitting x 12 minutes with at least FAIR + balance for improved safety within 7 days. ________________________________________________________________________________________________ PHYSICAL THERAPY: Initial Assessment, Treatment Day: Day of Assessment, PM 11/16/2018INPATIENT: Hospital Day: 2 Payor: Janelle / Plan: [id number] / Product Type: Managed Care Medicaid /  
  
NAME/AGE/GENDER: Dmitriy Olson is a 61 y.o. female PRIMARY DIAGNOSIS: CVA (cerebral vascular accident) [facility]) CVA (cerebral vascular accident) ([facility] [address] CVA (cerebral vascular accident) ([facility] [address] ICD-10: Treatment Diagnosis:  
 · Generalized Muscle Weakness (M62.81) · Difficulty in walking, Not elsewhere classified (R26.2) · History of falling (Z91.81) Precaution/Allergies: Patient has no known allergies. ASSESSMENT:  
Ms. Mariano Nix is a 61 y.o. female in the hospital for the above who was supine in bed upon arrival.  Pt reports that she lives in a one story house with a friend that has 5 steps to enter. Pt also reported that PTA she was independent with ADLs and ambulated with independence. Pt admitted to one recent falls in the past year. Ms. Mariano Nix presents to PT with grossly decreased AROM and strength in R LE (0/5). Pt also presents with increased tone in R knee extensors. She reported intact sensation to light touch in B LEs. During evaluation pt performed supine to sit with modA x 2. She has fair to poor sitting balance and required maxA x 2 with gait belt for STS transfer. Pt demonstrated poor standing balance and was unable to maintain for long. Pt given maxA x 2 to get back in supine position. Of note pt is very motivated but slightly impulsive with decreased safety awareness. Ms. Mariano Nix could benefit from skilled PT as she is currently functioning below her baseline. In addition to evaluation pt received treatment consisting of therapeutic activity. She was given cues for rolling techniques and safe handling of R paretic arm. Pt required min-modA to roll based on direction going. Pt also given Dennis for scooting up in bed with use of L UE/LE. Pt benefited from treatment giving weakness in R side and decreased safety awareness. This section established at most recent assessment PROBLEM LIST (Impairments causing functional limitations): 1. Decreased Strength 2. Decreased ADL/Functional Activities 3. Decreased Transfer Abilities 4. Decreased Ambulation Ability/Technique 5. Decreased Balance INTERVENTIONS PLANNED: (Benefits and precautions of physical therapy have been discussed with the patient.) 1. Balance Exercise 2. Bed Mobility 3. Family Education 4. Gait Training 5. Neuromuscular Re-education/Strengthening 6. Therapeutic Activites 7. Therapeutic Exercise/Strengthening 8. Transfer Training TREATMENT PLAN: Frequency/Duration: 3 times a week for duration of hospital stay Rehabilitation Potential For Stated Goals: Good RECOMMENDED REHABILITATION/EQUIPMENT: (at time of discharge pending progress): Due to the probability of continued deficits (see above) this patient will likely need continued skilled physical therapy after discharge. Equipment:  
? None at this time HISTORY:  
History of Present Injury/Illness (Reason for Referral): 
See H&P Past Medical History/Comorbidities: Ms. Jolly Jacob  has no past medical history on file. Ms. Jolly Jacob  has no past surgical history on file. Social History/Living Environment:  
Home Environment: Private residence # Steps to Enter: 5 One/Two Story Residence: One story Living Alone: No 
Support Systems: Friends \ neighbors Patient Expects to be Discharged to[de-identified] Unknown Current DME Used/Available at Home: Cane, straight Tub or Shower Type: Tub/Shower combination Prior Level of Function/Work/Activity: 
Lives with friend in one story house and PTA pt was independent with ADLs and ambulation. One recent fall reported. Number of Personal Factors/Comorbidities that affect the Plan of Care: 0: LOW COMPLEXITY EXAMINATION:  
Most Recent Physical Functioning:  
Gross Assessment: 
AROM: Grossly decreased, non-functional(R LE) Strength: Grossly decreased, non-functional(R LE) Tone: Abnormal 
Sensation: Intact Posture: 
  
Balance: 
Sitting: Impaired Sitting - Static: Fair (occasional) Sitting - Dynamic: Poor (constant support) Standing: Impaired Standing - Static: Poor Standing - Dynamic : Poor Bed Mobility: 
Rolling: Minimum assistance; Moderate assistance Supine to Sit: Moderate assistance;Assist x2 Sit to Supine: Maximum assistance;Assist x2 Scooting: Minimum assistance Wheelchair Mobility: 
  
Transfers: 
Sit to Stand: Maximum assistance;Assist x2 
 Stand to Sit: Maximum assistance;Assist x2 Gait: 
  
   
  
Body Structures Involved: 1. Nerves 2. Muscles Body Functions Affected: 1. Neuromusculoskeletal 
2. Movement Related Activities and Participation Affected: 1. General Tasks and Demands 2. Mobility 3. Self Care 4. Domestic Life 5. Community, Social and [place] Number of elements that affect the Plan of Care: 4+: HIGH COMPLEXITY CLINICAL PRESENTATION:  
Presentation: Stable and uncomplicated: LOW COMPLEXITY CLINICAL DECISION MAKING:  
[facility] AM-PAC 6 Clicks Basic Mobility Inpatient Short Form How much difficulty does the patient currently have. .. Unable A Lot A Little None 1. Turning over in bed (including adjusting bedclothes, sheets and blankets)? [] 1   [] 2   [x] 3   [] 4  
2. Sitting down on and standing up from a chair with arms ( e.g., wheelchair, bedside commode, etc.)   [] 1   [x] 2   [] 3   [] 4  
3. Moving from lying on back to sitting on the side of the bed? [] 1   [x] 2   [] 3   [] 4 How much help from another person does the patient currently need. .. Total A Lot A Little None 4. Moving to and from a bed to a chair (including a wheelchair)? [] 1   [x] 2   [] 3   [] 4  
5. Need to walk in hospital room? [x] 1   [] 2   [] 3   [] 4  
6. Climbing 3-5 steps with a railing? [x] 1   [] 2   [] 3   [] 4  
© 2007, Trustees of [facility], under license to [employer]. All rights reserved Score:  Initial: 11 Most Recent: X (Date: -- ) Interpretation of Tool:  Represents activities that are increasingly more difficult (i.e. Bed mobility, Transfers, Gait). Score 24 23 22-20 19-15 14-10 9-7 6 Modifier CH CI CJ CK CL CM CN   
 
? Mobility - Walking and Moving Around:  
  - CURRENT STATUS: CL - 60%-79% impaired, limited or restricted  - GOAL STATUS: CK - 40%-59% impaired, limited or restricted   - D/C STATUS:  ---------------To be determined--------------- 
 Payor: [facility] / Plan: [address] [place] [address] Avenue / Product Type: Managed Care Medicaid /   
 
Medical Necessity:    
· Patient demonstrates good rehab potential due to higher previous functional level. Reason for Services/Other Comments: 
· Patient continues to require skilled intervention due to decreased functional mobility and balance. Use of outcome tool(s) and clinical judgement create a POC that gives a: Clear prediction of patient's progress: LOW COMPLEXITY  
  
 
 
 
TREATMENT:  
(In addition to Assessment/Re-Assessment sessions the following treatments were rendered) Pre-treatment Symptoms/Complaints:  None Pain: Initial:  
Pain Intensity 1: 0  Post Session:  0 Therapeutic Activity: (    8 minutes): Therapeutic activities including rolling each direction, scooting up in bed, and education on handling of R paretic arm to improve mobility, strength and coordination. Required minimal cues   to promote coordination of bilateral, upper extremity(s), lower extremity(s). Braces/Orthotics/Lines/Etc:  
· IV 
· O2 Device: Room air Treatment/Session Assessment:   
· Response to Treatment:  Tolerated well but impulsive. · Interdisciplinary Collaboration:  
o Physical Therapist 
o Registered Nurse 
o Rehabilitation Attendant 
o Certified Nursing Assistant/Patient Care Technician · After treatment position/precautions:  
o Supine in bed 
o Bed alarm/tab alert on 
o Bed/Chair-wheels locked 
o Bed in low position 
o Call light within reach 
o RN notified 
o Side rails x 3  
· Compliance with Program/Exercises: Will assess as treatment progresses · Recommendations/Intent for next treatment session: \"Next visit will focus on advancements to more challenging activities and reduction in assistance provided\". Total Treatment Duration: PT Patient Time In/Time Out Time In: 8991 Time Out: 3940 Andrez Lange, PT, DPT

## 2018-11-16 NOTE — PROGRESS NOTES
Progress Note 2018 Admit Date: 11/15/2018 11:00 AM  
NAME: Meaghan Randall :  1955 MRN:  [id number] Attending: Minerva Velazquez MD 
PCP:  None Treatment Team: Attending Provider: Minerva Velazquez MD; Consulting Provider: Zion Gomes MD; Charge Nurse: Nickolas Iyer Speech Language Pathologist: Manas Love; Utilization Review: Marcela Dean Full Code SUBJECTIVE:  
Ms. Jacquelin Castorena is a 60 yo female with PMH of HTN, tobacco abuse, who presented with c/o right sided weakness. She reported feeling dizzy on ambulation resulting in multiple falls. Her PCP stopped her anti-hypertensives. CT head neg for acute findings. MRI brain showed small acute left parietal white matter infarct. Carotid US shows extensive atherosclerosis in the carotid arteries bilaterally, no ultrasound evidence of significant carotid stenosis. Echo with EF 60-65%, no right to left shunt. A1C 5.6. . Pt started on ASA, Statin. Denies CP, SOB. No past medical history on file. Recent Results (from the past 24 hour(s)) LIPID PANEL Collection Time: 18  4:19 AM  
Result Value Ref Range LIPID PROFILE Cholesterol, total 211 (H) <200 MG/DL Triglyceride 47 35 - 150 MG/DL  
 HDL Cholesterol 60 40 - 60 MG/DL  
 LDL, calculated 141.6 (H) <100 MG/DL VLDL, calculated 9.4 6.0 - 23.0 MG/DL  
 CHOL/HDL Ratio 3.5 HEMOGLOBIN A1C WITH EAG Collection Time: 18  4:19 AM  
Result Value Ref Range Hemoglobin A1c 5.6 4.8 - 6.0 % Est. average glucose 114 mg/dL METABOLIC PANEL, BASIC Collection Time: 18  4:19 AM  
Result Value Ref Range Sodium 134 (L) 136 - 145 mmol/L Potassium 3.3 (L) 3.5 - 5.1 mmol/L Chloride 102 98 - 107 mmol/L  
 CO2 24 21 - 32 mmol/L Anion gap 8 7 - 16 mmol/L Glucose 97 65 - 100 mg/dL BUN 19 8 - 23 MG/DL Creatinine 0.86 0.6 - 1.0 MG/DL  
 GFR est AA >60 >60 ml/min/1.73m2 GFR est non-AA >60 >60 ml/min/1.73m2 Calcium 8.7 8.3 - 10.4 MG/DL  
CBC WITH AUTOMATED DIFF Collection Time: 11/16/18  4:19 AM  
Result Value Ref Range WBC 5.3 4.3 - 11.1 K/uL  
 RBC 4.20 4.05 - 5.2 M/uL  
 HGB 12.7 11.7 - 15.4 g/dL HCT 37.7 35.8 - 46.3 % MCV 89.8 79.6 - 97.8 FL  
 MCH 30.2 26.1 - 32.9 PG  
 MCHC 33.7 31.4 - 35.0 g/dL  
 RDW 13.3 % PLATELET 847 512 - 335 K/uL MPV 9.6 9.4 - 12.3 FL ABSOLUTE NRBC 0.00 0.0 - 0.2 K/uL  
 DF AUTOMATED NEUTROPHILS 69 43 - 78 % LYMPHOCYTES 18 13 - 44 % MONOCYTES 11 4.0 - 12.0 % EOSINOPHILS 1 0.5 - 7.8 % BASOPHILS 1 0.0 - 2.0 % IMMATURE GRANULOCYTES 0 0.0 - 5.0 %  
 ABS. NEUTROPHILS 3.6 1.7 - 8.2 K/UL  
 ABS. LYMPHOCYTES 1.0 0.5 - 4.6 K/UL  
 ABS. MONOCYTES 0.6 0.1 - 1.3 K/UL  
 ABS. EOSINOPHILS 0.0 0.0 - 0.8 K/UL  
 ABS. BASOPHILS 0.0 0.0 - 0.2 K/UL  
 ABS. IMM. GRANS. 0.0 0.0 - 0.5 K/UL No Known Allergies Current Facility-Administered Medications Medication Dose Route Frequency Provider Last Rate Last Dose  influenza vaccine 2018-19 (6 mos+)(PF) (FLUARIX QUAD/FLULAVAL QUAD) injection 0.5 mL  0.5 mL IntraMUSCular PRIOR TO DISCHARGE Bradly Lua MD      
 potassium chloride (KLOR-CON) packet 40 mEq  40 mEq Oral ONCE Bradly Lua MD      
 sodium chloride (NS) flush 5-10 mL  5-10 mL IntraVENous Q8H Calli Benavidez MD   Stopped at 11/16/18 0600  
 sodium chloride (NS) flush 5-10 mL  5-10 mL IntraVENous PRN Bradly Lua MD      
 ondansetron (ZOFRAN) injection 4 mg  4 mg IntraVENous Q6H PRN Bradly Lua MD      
 aspirin chewable tablet 81 mg  81 mg Oral DAILY Bradly Lua MD   81 mg at 11/16/18 0812  
 atorvastatin (LIPITOR) tablet 40 mg  40 mg Oral QHS Bradly Lua MD   40 mg at 11/16/18 0560  acetaminophen (TYLENOL) tablet 650 mg  650 mg Oral Q6H PRN Bradly Lua MD      
 enoxaparin (LOVENOX) injection 40 mg  40 mg SubCUTAneous Q24H Cecil Seth MD   40 mg at 18 0825  
 nicotine (NICODERM CQ) 21 mg/24 hr patch 1 Patch  1 Patch TransDERmal DAILY Chioma Lua MD   1 Patch at 11/15/18 2130  
 albuterol (PROVENTIL VENTOLIN) nebulizer solution 2.5 mg  2.5 mg Nebulization Q4H PRN Chioma Lua MD      
 tuberculin injection 5 Units  5 Units IntraDERMal ONCE Chioma Lua MD   5 Units at 18 6377  hydrALAZINE (APRESOLINE) 20 mg/mL injection 10 mg  10 mg IntraVENous Q6H PRN Rosio Lua MD   10 mg at 18 0825  
 0.9% sodium chloride infusion  75 mL/hr IntraVENous CONTINUOUS Chioma Lua MD 75 mL/hr at 18 0036 75 mL/hr at 18 0036 Review of Systems negative with exception of pertinent positives noted above PHYSICAL EXAM  
 
Visit Vitals BP (!) 156/94 (BP 1 Location: Left arm, BP Patient Position: At rest) Pulse 100 Temp 97.7 °F (36.5 °C) Resp 17 SpO2 94% Breastfeeding? No  
  
Temp (24hrs), Av.7 °F (36.5 °C), Min:96.7 °F (35.9 °C), Max:98.4 °F (36.9 °C) Oxygen Therapy O2 Sat (%): 94 % (18 1200) O2 Device: Room air (11/15/18 2231) No intake or output data in the 24 hours ending 18 1404 General: No acute distress   
Lungs: CTA bilaterally. Resp even and nonlabored Heart:  S1S2 present without murmurs rubs gallops. RRR. No edema Abdomen: Soft, non tender, non distended. BS present Extremities: No cyanosis. Warm/dry Neurologic:  A/O X4. Right facial droop. Speech slurred. RUE strength 1/5, RLE flaccid. LUE/LLE strength 5/5. EOMs intact. PERRLA. Sensation intact. Results summary of Diagnostic Studies/Procedures copied from within [facility] EMR: 
 
 
ASSESSMENT Active Hospital Problems Diagnosis Date Noted  CVA (cerebral vascular accident) ([facility] [address] 11/15/2018  Hypertension 11/15/2018  Tobacco use 11/15/2018  Hyponatremia 11/15/2018  Leukopenia 11/15/2018 Plan: CVA:  MRI showing left parietal CVA. On ASA, Statin. Echo with EF 60-65%, no shunt. Carotid US  hows extensive atherosclerosis in the carotid arteries bilaterally, no ultrasound evidence of significant carotid stenosis. Continue PT/OT. HTN:  Start norvasc. Continue prn hydralazine. Tobacco abuse:  Cessation discussed Hypokalemia:  Replace. BMP in AM 
 
 
Notes, labs, VS, diagnostic testing reviewed Time spent with pt 20 min DVT Prophylaxis:  lovenox Plan of Care Discussed with: Supervising MD Dr. Roberto Abrams, care team, pt Silvana Mccain, MERRILL

## 2018-11-16 NOTE — PROGRESS NOTES
Ischemic Stroke without Activase/TIA 
 
VTE Prophylaxis: Yes: Lovenox Antiplatelet: No 
 
Statin if LDL Greater Than or Equal to100: Yes: Lipitor BP Parameters: Less Than 220/120 for 24 hours, then consult MD for parameters Controlled With: PRN - IV Dysphagia Screen Completed: Yes: Pass Dysphagia Screening Vocal Quality/Secretions: Normal 
History of Dysphagia: No 
O2 Saturation: Normal 
Alertness: Normal 
Pre-Swallow Assessment Score: 0 Purees: No difficulty noted Water by Cup: No difficulty noted Water by Straw: No difficulty noted Patient has PEG, NG Tube, Feeding Tube: No 
 
Medication orders per above route: Yes 
 
Nutrition Status: PO 
 
NIH Stroke Scale Complete: Yes: 4 Frequency of Vital Signs: Every 4 hours Frequency of Neuro Checks: Every 4 hours Daily Education/Care Plan Updated: Yes David Brower

## 2018-11-16 NOTE — H&P
Hospitalist H&P Note Admit Date:  11/15/2018 11:00 AM  
Name:  Cheryl Jorgensen Age:  61 y.o. 
:  1955 MRN:  [id number] PCP:  None Treatment Team: Attending Provider: Praveen Kulkarni MD; Primary Nurse: Mimi Perez RN 
 
HPI:  
 
CC:  Right sided weakness Ms. Guillermo Diaz is a 62 yo female with PMH of HTN, tobacco use who is evaluated with complaints of right sided weakness. She feels dizzy with ambulation and has had several falls. Stopped antihypertensives per PCP. Admits to ongoing smoking. Denies speech changes. CT head negative but MRI brain shows left parietal CVA. 10 systems reviewed and negative except as noted in HPI. - has hearing loss, constipation, arthritis and weight gain No past medical history on file. HTN, tobacco use No past surgical history on file. No Known Allergies Social History Tobacco Use  Smoking status: Current Every Day Smoker Substance Use Topics  Alcohol use: No  
  Frequency: Never FH: HTN, DM2 There is no immunization history for the selected administration types on file for this patient. PTA Medications: 
None Objective:  
 
Patient Vitals for the past 24 hrs: 
 Temp Pulse Resp BP SpO2  
11/15/18 2148  85  169/86   
11/15/18 2136  87  (!) 167/113   
11/15/18 2131  87  (!) 194/112   
11/15/18 2110     93 % 11/15/18 2107 98.2 °F (36.8 °C) 82 18 (!) 180/119 93 % 11/15/18 1040 97.8 °F (36.6 °C) 93 18 (!) 160/102 98 % Oxygen Therapy O2 Sat (%): 93 % (11/15/18 2110) O2 Device: Room air (11/15/18 2110) No intake or output data in the 24 hours ending 11/15/18 2228 Physical Exam: 
General:    Alert. No distress Eyes:   Normal sclera. Extraocular movements intact. PERRLA 
ENT:  Normocephalic, atraumatic. Moist mucous membranes CV:   RRR. No m/r/g. No edema Lungs:  CTAB. No wheezing, rhonchi, or rales. Abdomen: Soft, nontender, nondistended. Present BS Extremities: Warm and dry. Sherlean Narrow Neurologic:  CN 2-12 grossly intact. 2/5 right UE and LE strength, 5/5 left UE and LE strength Skin:     No rashes or jaundice. Normal coloration Psych:  Normal mood and affect. I reviewed the labs, imaging, EKGs, telemetry, and other studies done this admission. EKG: tracing reviewed as NSR Data Review:  
Recent Results (from the past 24 hour(s)) CBC WITH AUTOMATED DIFF Collection Time: 11/15/18 12:06 PM  
Result Value Ref Range WBC 3.7 (L) 4.3 - 11.1 K/uL  
 RBC 4.60 4.05 - 5.2 M/uL  
 HGB 13.8 11.7 - 15.4 g/dL HCT 42.6 35.8 - 46.3 % MCV 92.6 79.6 - 97.8 FL  
 MCH 30.0 26.1 - 32.9 PG  
 MCHC 32.4 31.4 - 35.0 g/dL  
 RDW 13.4 % PLATELET 367 407 - 931 K/uL MPV 9.2 (L) 9.4 - 12.3 FL ABSOLUTE NRBC 0.00 0.0 - 0.2 K/uL  
 DF AUTOMATED NEUTROPHILS 58 43 - 78 % LYMPHOCYTES 23 13 - 44 % MONOCYTES 14 (H) 4.0 - 12.0 % EOSINOPHILS 3 0.5 - 7.8 % BASOPHILS 1 0.0 - 2.0 % IMMATURE GRANULOCYTES 1 0.0 - 5.0 %  
 ABS. NEUTROPHILS 2.2 1.7 - 8.2 K/UL  
 ABS. LYMPHOCYTES 0.9 0.5 - 4.6 K/UL  
 ABS. MONOCYTES 0.5 0.1 - 1.3 K/UL  
 ABS. EOSINOPHILS 0.1 0.0 - 0.8 K/UL  
 ABS. BASOPHILS 0.0 0.0 - 0.2 K/UL  
 ABS. IMM. GRANS. 0.0 0.0 - 0.5 K/UL METABOLIC PANEL, COMPREHENSIVE Collection Time: 11/15/18 12:06 PM  
Result Value Ref Range Sodium 131 (L) 136 - 145 mmol/L Potassium 3.8 3.5 - 5.1 mmol/L Chloride 100 98 - 107 mmol/L  
 CO2 23 21 - 32 mmol/L Anion gap 8 7 - 16 mmol/L Glucose 98 65 - 100 mg/dL BUN 18 8 - 23 MG/DL Creatinine 1.04 (H) 0.6 - 1.0 MG/DL  
 GFR est AA >60 >60 ml/min/1.73m2 GFR est non-AA 57 (L) >60 ml/min/1.73m2 Calcium 9.0 8.3 - 10.4 MG/DL Bilirubin, total 0.6 0.2 - 1.1 MG/DL  
 ALT (SGPT) 23 12 - 65 U/L  
 AST (SGOT) 17 15 - 37 U/L Alk. phosphatase 118 50 - 136 U/L Protein, total 8.1 6.3 - 8.2 g/dL Albumin 3.7 3.2 - 4.6 g/dL Globulin 4.4 (H) 2.3 - 3.5 g/dL A-G Ratio 0.8 (L) 1.2 - 3.5 EKG, 12 LEAD, INITIAL Collection Time: 11/15/18 12:11 PM  
Result Value Ref Range Ventricular Rate 84 BPM  
 Atrial Rate 84 BPM  
 P-R Interval 136 ms QRS Duration 70 ms Q-T Interval 398 ms QTC Calculation (Bezet) 470 ms Calculated P Axis 65 degrees Calculated R Axis -19 degrees Calculated T Axis 53 degrees Diagnosis Normal sinus rhythm Possible Left atrial enlargement Anterior infarct , age undetermined Abnormal ECG No previous ECGs available Confirmed by Justin Reynolds MD (), Doren Dance ([id number]) on 11/15/2018 4:54:32 PM 
  
 
 
All Micro Results None Other Studies: Xr Chest Sngl V Result Date: 11/15/2018 Chest X-ray INDICATION:   Chest congestion A portable AP view of the chest was obtained. FINDINGS: The lungs are clear. There are no infiltrates or effusions. The heart size is normal.  The aorta is slightly prominent and atherosclerotic. IMPRESSION: No acute findings in the chest  
 
Mri Brain Wo Cont Result Date: 11/15/2018 MRI of the brain INDICATION:  Right arm and hand weakness Standard MRI sequences were obtained through the brain in multiple planes without IV contrast FINDINGS: There is a small acute lacunar infarcts in the left corona radiata. There is no associated hemorrhage. No other areas of acute infarction are seen. There is an old lacunar infarct in the right midbrain. There is moderate chronic change in the cerebellum and periventricular white matter. .  The ventricles are normal in size. There are no extra-axial fluid collections. There are no intracranial masses. There are no bony lesions. The sinuses are clear. IMPRESSION: Small acute left parietal white matter infarct. No evidence of hemorrhage. Ct Head Wo Cont Result Date: 11/15/2018 CT HEAD WITHOUT CONTRAST. INDICATION: Right-sided weakness. COMPARISON: None. TECHNIQUE:   5 mm axial scans from the skull base to the vertex. Our CT scanners use one or more of the following:  Automated exposure control, adjustment of the mA and or kV according to patient size, iterative reconstruction. FINDINGS:  No acute intraparenchymal hemorrhage or abnormal extra-axial fluid collection. The ventricles are normal size. Fairly extensive white matter low attenuation is present, especially frontal lobe nonspecific, likely chronic small vessel disease. Old small pontine infarct on the right. No midline shift mass effect. Included portion of the paranasal sinuses and orbits grossly unremarkable. IMPRESSION:  Negative for acute intracranial hemorrhage. Chronic changes. MRI is more sensitive for acute infarct. Assessment and Plan:  
 
Hospital Problems as of 11/15/2018 Never Reviewed Codes Class Noted - Resolved POA * (Principal) CVA (cerebral vascular accident) ([facility] [address] ICD-10-CM: I63.9 ICD-9-CM: 434.91  11/15/2018 - Present Unknown Hypertension (Chronic) ICD-10-CM: I10 
ICD-9-CM: 401.9  11/15/2018 - Present Yes Tobacco use (Chronic) ICD-10-CM: Z72.0 ICD-9-CM: 305.1  11/15/2018 - Present Yes Hyponatremia ICD-10-CM: E87.1 ICD-9-CM: 276.1  11/15/2018 - Present Yes Leukopenia ICD-10-CM: D72.819 ICD-9-CM: 288.50  11/15/2018 - Present Yes · CVA: admit to remote tele, add asa/statin/ will need antihypertensives/ check ECHO/carotid duplex , needs smoking cessation , discharge needs per case management · Tobacco use: nicotine patch, needs cessation · HTN: prn hydralazine with some permissive control and start oral agents in 24 hours · Hyponatremia: gentle IVF, repeat BMP · Leukopenia: mild, followup CBC Discharge planning:  PPD, PT/OT/ SLP 
DVT ppx: lovenox Code status:  Full Estimated LOS:  Greater than 2 midnights Risk:  high Care plan: loco Quezada [phone] Signed: Sarah Bello MD

## 2018-11-16 NOTE — PROGRESS NOTES
Ischemic Stroke without Activase/TIA 
 
VTE Prophylaxis: Yes: lovenox Antiplatelet: No 
 
Statin if LDL Greater Than or Equal to100: Yes: liptor BP Parameters: Less Than 220/120 for 24 hours, then consult MD for parameters Controlled With: PRN - IV Dysphagia Screen Completed: Yes: Pass Dysphagia Screening Vocal Quality/Secretions: (!) Abnormal 
History of Dysphagia: No 
O2 Saturation: Normal 
Alertness: Normal 
Pre-Swallow Assessment Score: 1 Purees: No difficulty noted Water by Cup: No difficulty noted Water by Straw: No difficulty noted Patient has PEG, NG Tube, Feeding Tube: No 
 
Medication orders per above route: Yes 
 
Nutrition Status: PO 
 
NIH Stroke Scale Complete: Yes: 4 Frequency of Vital Signs: Every 4 hours Frequency of Neuro Checks: Every 4 hours Daily Education/Care Plan Updated: Yes Karissa Garcia

## 2018-11-16 NOTE — PROGRESS NOTES
11/16/18 [facility] Dual Skin Pressure Injury Assessment Dual Skin Pressure Injury Assessment WDL Second Care Provider (Based on [address] [place] [address]) Josue Martínez RN Skin Integumentary Skin Integumentary (WDL) WDL Skin Color Appropriate for ethnicity;Ashen Skin Condition/Temp Warm;Dry Skin Integrity Intact Turgor Epidermis thin w/ loss of subcut tissue Hair Growth Absent Varicosities Absent Wound Prevention and Protection Methods Orientation of Wound Prevention Posterior Location of Wound Prevention Buttocks Dressing Present  No  
Wound Offloading (Prevention Methods) Bed, pressure reduction mattress

## 2018-11-16 NOTE — ED NOTES
TRANSFER - OUT REPORT: 
 
Verbal report given to Elizabeth Drake RN on Umair Meredith  being transferred to  for routine progression of care Report consisted of patients Situation, Background, Assessment and  
Recommendations(SBAR). Information from the following report(s) SBAR was reviewed with the receiving nurse. Lines:  
Peripheral IV 11/15/18 Left Antecubital (Active) Site Assessment Clean, dry, & intact 11/15/2018  9:20 PM  
Phlebitis Assessment 0 11/15/2018  9:20 PM  
Infiltration Assessment 0 11/15/2018  9:20 PM  
Dressing Status Clean, dry, & intact 11/15/2018  9:20 PM  
Dressing Type Transparent 11/15/2018  9:20 PM  
  
 
Opportunity for questions and clarification was provided. Patient transported with: 
 [employer]

## 2018-11-16 NOTE — ED NOTES
Straight cathed pt for urine specimen due to inability to urinate. Performed POC urine dip. Assisted in cath by Claudetta Doing, [facility].

## 2018-11-16 NOTE — PROGRESS NOTES
TRANSFER - IN REPORT: 
 
Verbal report received from BISI Vázquez RN on Reny Dense  being received from ER for routine progression of care Report consisted of patients Situation, Background, Assessment and  
Recommendations(SBAR). Information from the following report(s) SBAR and Recent Results was reviewed with the receiving nurse. Opportunity for questions and clarification was provided. Assessment completed upon patients arrival to unit and care assumed.

## 2018-11-16 NOTE — ED NOTES
Patient's family at bedside loco Santos is going home, can be reached at cell or home number if needed. Cell: [phone]. Home: [phone].

## 2018-11-16 NOTE — PROGRESS NOTES
CM attempted to meet with patient to assess for discharge needs per consult. Patient was currently receiving echo in room. CM attempted to contact patient's niece by telephone, but she was reportedly also present in patient's room for echo. CM will reattempt consult at a later time.

## 2018-11-16 NOTE — ED NOTES
Pt's niece states that she needs to go  her child. Gave phone number for contact if pt is discharged. Assisted pt in moving from w/c to bed. Provided warm blanket for comfort.

## 2018-11-16 NOTE — PROGRESS NOTES
CM attempted to meet with patient regarding discharge planning consult. She was currently working with therapy at time of attempt. RN Jessica Wahl informed patient has some slurred speech, and patient's niece, Guillermo Penn ([phone]) is able to assist with discharge planning. SOCIAL: 
Patient lives with her aunt in a 1 level house with 2 step entry and a tub. Patient's grandson and niece Bisi Stewart) both visit patient's home 5+ days per week. She has strong family support from extended family. She is not a . She receives PCP services at [facility]. She gets her prescriptions through [facility]. No POA. Yuridia Louis (Niece) reports she is primary contact. MOBILITY / HISTORY: 
Patient is independent with ambulating and managing ADLs at baseline. Niece reports patient uses a straight cane only for getting in/out of vehicles. DME - Straight cane. No home oxygen. No dialysis. No HH or STR history. PLAN FOR DISCHARGE: Anticipate patient will need rehab after discharge. [facility] consult placed based on recommendations discussed during IDT rounds on this date. [facility] provided SNF choices for [place] and [address]. Referrals placed in [address] Awaiting responses. Care Management Interventions PCP Verified by CM: Yes([facility]) Mode of Transport at Discharge: Other (see comment)(family ) Transition of Care Consult (CM Consult): Discharge Planning Discharge Durable Medical Equipment: No 
Physical Therapy Consult: Yes Occupational Therapy Consult: Yes Speech Therapy Consult: Yes Current Support Network: Own Home, Family Lives [place] Confirm Follow Up Transport: Family Plan discussed with Pt/Family/Caregiver: Yes Freedom of Choice Offered: Yes Discharge Location Discharge Placement: Rehab hospital/unit acute

## 2018-11-16 NOTE — ED NOTES
Per john paul in MRI, patient is still in MRI and has been requested for transport to bring back to ER 18.

## 2018-11-16 NOTE — PROGRESS NOTES
Problem: Falls - Risk of 
Goal: *Absence of Falls Document Maira Harrell Fall Risk and appropriate interventions in the flowsheet. Outcome: Progressing Towards Goal 
Fall Risk Interventions: 
Mobility Interventions: Bed/chair exit alarm, Communicate number of staff needed for ambulation/transfer, Patient to call before getting OOB, Strengthening exercises (ROM-active/passive) Medication Interventions: Bed/chair exit alarm, Patient to call before getting OOB Elimination Interventions: Call light in reach, Patient to call for help with toileting needs Problem: Pressure Injury - Risk of 
Goal: *Prevention of pressure injury Document Moshe Scale and appropriate interventions in the flowsheet. Outcome: Progressing Towards Goal 
Pressure Injury Interventions: 
Sensory Interventions: Assess changes in LOC, Keep linens dry and wrinkle-free, Pad between skin to skin, Minimize linen layers Moisture Interventions: Absorbent underpads, Limit adult briefs Activity Interventions: PT/OT evaluation, Pressure redistribution bed/mattress(bed type), Increase time out of bed Mobility Interventions: HOB 30 degrees or less, Pressure redistribution bed/mattress (bed type), PT/OT evaluation Nutrition Interventions: Document food/fluid/supplement intake Friction and Shear Interventions: HOB 30 degrees or less

## 2018-11-16 NOTE — PROGRESS NOTES
Dysphagia Goals: LTG: Patient will tolerate least restrictive diet without signs/symptoms of aspiration at discharge for safe swallow function. STG: Patient will tolerate mech soft(cardiac)/thin liquids without overt s/sx of penetration/aspiration STG: Patient will participate in oral motor exercises to improve weakness STG: Patient will participate in full speech/lang/cog evaluation Speech language pathology: bedside swallow note: Initial Assessment NAME/AGE/GENDER: Jarad Christianson is a 61 y.o. female DATE: 11/16/2018 PRIMARY DIAGNOSIS: CVA (cerebral vascular accident) ([facility] [address] ICD-10: Treatment Diagnosis: Dysphagia, Oral phase R13.11 INTERDISCIPLINARY COLLABORATION: Registered Nurse PRECAUTIONS/ALLERGIES: Patient has no known allergies. ASSESSMENT: Ms. Val Persaud presents with oral dysphagia. Alert/oriented x4. Dysarthric speech. Oral motor exam revealed overall right sided weakness with imprecise articulatory movements. Labial, buccal, and lingual weakness with right sided lingual deviation. Patient presented with thin liquid via cup and straw, puree, mixed, and solid consistencies. Anterior loss of thin liquid from oral cavity due to labial weakness, however resolved with use of straw. Timely swallow initiation with 1-2 swallows per bite/sip. Prolonged mastication of chewable textures likely due to absent dentition. Oral residue with solid texture requiring cue for liquid wash. No overt s/sx of aspiration/penetration with any po trials presented. Vocal quality clear. Respirations even/unlabored. Recommend downgrade to mechanical soft (cardiac)/Thin liquid. ST will continue to f/u for diet tolerance, oral motor exercises, and full speech/lang/cog eval.  Patient will benefit from skilled intervention to address the below impairments. ?????? ? ? This section established at most recent assessment?????????? 
PROBLEM LIST (Impairments causing functional limitations): 1. Oral dysphagia REHABILITATION POTENTIAL FOR STATED GOALS: Good PLAN OF CARE:  
Patient will benefit from skilled intervention to address the following impairments. RECOMMENDATIONS AND PLANNED INTERVENTIONS (Benefits and precautions of therapy have been discussed with the patient.): 
· Mech soft (cardiac)/Thin liquids MEDICATIONS: 
· Whole in puree ASPIRATION PRECAUTIONS: 
· Slow rate of intake · Small bites/sips · Upright at 90 degrees during meal 
COMPENSATORY STRATEGIES/MODIFICATIONS INCLUDING: 
· Small sips and bites OTHER RECOMMENDATIONS (including follow up treatment recommendations):  
· Oral motor exercises · Family training/education · Patient education RECOMMENDED DIET MODIFICATIONS DISCUSSED WITH: 
· Nursing · Patient FREQUENCY/DURATION: Continue to follow patient 3 times a week for duration of hospital stay to address above goals. RECOMMENDED REHABILITATION/EQUIPMENT: (at time of discharge pending progress): Due to the probability of continued deficits (see above) this patient will likely need continued skilled speech therapy after discharge. SUBJECTIVE:  
Alert/oriented x4. Dysarthric speech History of Present Injury/Illness: Ms. Mary Lacy  has no past medical history on file. .  She also  has no past surgical history on file. Present Symptoms: oral dysphagia Pain Scale 1: Numeric (0 - 10) Pain Intensity 1: 0 Current Medications: No current facility-administered medications on file prior to encounter. No current outpatient medications on file prior to encounter. Current Dietary Status:   
 Regular (cardiac)/Thin 
 
Social History/Home Situation:   
Home Environment: Private residence # Steps to Enter: 2 One/Two Story Residence: One story Living Alone: No 
Support Systems: Friends \ neighbors, Family member(s) Patient Expects to be Discharged to[de-identified] Private residence Current DME Used/Available at Home: gail Roach OBJECTIVE:  
Respiratory Status:  Room air CXR Results: No acute findings in the chest 
MRI Results: Small acute left parietal white matter infarct. No evidence of 
hemorrhage. CT Results:Negative for acute intracranial hemorrhage. Chronic changes. MRI 
is more sensitive for acute infarct. 
  
Oral Motor Structure/Speech:  Oral-Motor Structure/Motor Speech Labial: Decreased rate, Decreased seal, Right droop Dentition: Edentulous Oral Hygiene: fair Lingual: Decreased rate, Decreased strength, Right deviation Cognitive and Communication Status: 
Neurologic State: Alert Orientation Level: Oriented X4 Cognition: Follows commands Perception: Cues to maintain midline in sitting;Verbal 
Perseveration: No perseveration noted Safety/Judgement: Fall prevention BEDSIDE SWALLOW EVALUATIONOral Assessment: 
Oral Assessment Labial: Decreased rate;Decreased seal;Right droop Dentition: Edentulous Oral Hygiene: fair Lingual: Decreased rate;Decreased strength;Right deviation P.O. Trials: The patient was given the following:  
Consistency Presented: Puree; Solid; Thin liquid;Mixed consistency How Presented: Self-fed/presented;Cup/gulp; Spoon;Cup/sip;Straw;Successive swallows ORAL PHASE: 
Bolus Acceptance: Impaired Bolus Formation/Control: Impaired Type of Impairment: Mastication;Spillage Oral Residue: 10-50% of bolus PHARYNGEAL PHASE: 
Initiation of Swallow: No impairment Laryngeal Elevation: Functional 
Aspiration Signs/Symptoms: None Vocal Quality: No impairment Pharyngeal Phase Characteristics: Double swallow OTHER OBSERVATIONS: 
Rate/bite size: WNL Endurance:  Questionable Comments: Tool Used: Dysphagia Outcome and Severity Scale (RD) Score Comments Normal Diet  [] 7 With no strategies or extra time needed Functional Swallow  [] 6 May have mild oral or pharyngeal delay Mild Dysphagia   [x] 5 Which may require one diet consistency restricted (those who demonstrate penetration which is entirely cleared on MBS would be included) Mild-Moderate Dysphagia  [] 4 With 1-2 diet consistencies restricted Moderate Dysphagia  [] 3 With 2 or more diet consistencies restricted Moderately Severe Dysphagia  [] 2 With partial PO strategies (trials with ST only) Severe Dysphagia  [] 1 With inability to tolerate any PO safely Score:  Initial: 5 Most Recent: X (Date: -- ) Interpretation of Tool: The Dysphagia Outcome and Severity Scale (RD) is a simple, easy-to-use, 7-point scale developed to systematically rate the functional severity of dysphagia based on objective assessment and make recommendations for diet level, independence level, and type of nutrition. Score 7 6 5 4 3 2 1 Modifier CH CI CJ CK CL CM CN ? Swallowing:  
  - CURRENT STATUS: CJ - 20%-39% impaired, limited or restricted  - GOAL STATUS:  CH - 0% impaired, limited or restricted  - D/C STATUS:  ---------------To be determined--------------- Payor: Janelle / Plan: [address] [place] [address] Avenue / Product Type: Managed Care Medicaid /  
 
TREATMENT:  
 (In addition to Assessment/Re-Assessment sessions the following treatments were rendered) Assessment/Reassessment only, no treatment provided today MODALITIES:  
  
  
  
  
  
  
  
  
  
  
    
  
  
  
  
  
  
  
  
   
 
ORAL MOTOR  EXERCISES: 
  
  
  
  
  
  
  
  
  
  
  
  
  
  
  
  
  
  
  
  
  
  
  
  
  
  
    
  
  
  
  
  
  
  
  
  
  
  
  
  
  
  
  
  
  
  
  
  
  
  
  
  
   
 
LARYNGEAL / PHARYNGEAL EXERCISES: 
  
  
  
  
  
  
  
  
  
  
  
  
  
  
  
  
  
  
  
  
  
    
  
  
  
  
  
  
  
  
  
  
  
  
  
  
  
  
  
  
  
   
 
__________________________________________________________________________________________________ Safety: After treatment position/precautions: 
· Call light within reach · RN notified · Upright in Bed Treatment Assessment:  eval only . Progression/Medical Necessity:  
· Skilled intervention continues to be required due to patient still consuming a modified diet. Compliance with Program/Exercises: Will assess as treatment progresses Reason for Continuation of Services/Other Comments: 
· Patient continues to require skilled intervention due to oral dysphagia. Recommendations/Intent for next treatment session: Treatment next visit will focus on diet tolerance and full speech/lang/cog eval 
Total Treatment Duration: 
Time In: 6267 Time Out: 5569 Jazlyn Wyman, [facility], [facility] ERICA DUMONT, CF-SLP

## 2018-11-16 NOTE — CONSULTS
Physical Medicine & Rehabilitation Note-consult    Patient: Derrick Concepcion MRN: [id number]  SSN: [id number]    YOB: 1955  Age: 61 y.o. Sex: female      Admit Date: 11/15/2018  Admitting Physician: Taft Cogan, MD    Medical Decision Making/Plan/Recommend: Functional deficit, mobility, gait impairment. Acute PT, OT to address ontinue right sided motor deficits, transfer training, balance activities, adaptive techniques, exercises to improve strength and balance to maximize ADLs and functional mobility. ST to continue to follow to address dysarthria, cognitive deficits. We will follow PT/OT assessment and patient's functional progress to determine best rehab course. Thank you for the opportunity to participate in the care of this patient. Chief Complaint : Gait dysfunction secondary to below. Admit Diagnosis: CVA (cerebral vascular accident) ([facility]  acute left parietal CVA    right hemiparesis  Weakness  Hypertension    Tobacco use   Leukopenia   DVT risk  Acute Rehab Dx:  right hemiparesis  Dysarthria  Dysphagia  Cognitive deficit  Mobility and ambulation deficits  Self Care/ADL deficits    Medical Dx:No past medical history on file. Subjective:     Date of Evaluation:  November 16, 2018    HPI: Derrick Concepcion is a 61 y.o. female patient at [address] [place] [address] who was admitted on 11/15/2018  by Taft Cogan, MD with below mentioned medical history ,is being seen for Physical Medicine and Rehabilitation consult. Derrick Concepcion presented with acute onset confusion, right sided weakness. MRI showed an acute left parietal CVA  Patient was admitted with diagnosis of acute CVA and started on medical management for acute stroke and secondary prevention. Derrick Concepcion is seen and examined today. Medical Records reviewed. Patient is to start acute therapy.  Patient shows significant right sided deficits, essentially is plegic RUE, RLE on exam. Sensation appears grossly intact. Patient is dysarthric and appears to have some difficulty following commands. OT has worked with the patient so far. Patient requires maximal assistance x 2 for rolling in supine, bed mobility, and supine to sit with maximal assistance x2. Patient lives with her aunt. She was independent with all activities. Current Level of Function:   bed mobility - max A x2, transfers - NT, decreased balance , ambulation - NT      No family history on file. Social History     Tobacco Use    Smoking status: Current Every Day Smoker   Substance Use Topics    Alcohol use: No     Frequency: Never     No past surgical history on file. Prior to Admission medications    Not on File     No Known Allergies     Review of Systems: + right sided weaknessDenies chest pain, shortness of breath, cough, headache, visual problems, abdominal pain, dysurea, calf pain. Pertinent positives are as noted in the medical records and unremarkable otherwise. Objective:     Vitals:  Blood pressure (!) 156/94, pulse 100, temperature 97.7 °F (36.5 °C), resp. rate 17, SpO2 94 %, not currently breastfeeding. Temp (24hrs), Av.7 °F (36.5 °C), Min:96.7 °F (35.9 °C), Max:98.4 °F (36.9 °C)        Intake and Output:  No intake/output data recorded. Physical Exam:   General: Alert and age appropriately oriented. No acute cardio respiratory distress. HEENT: Normocephalic,no scleral icterus  Oral mucosa moist without cyanosis, No JVD. Lungs: Clear to auscultation anteriorly   Heart: Regular rate and rhythm, S1, S2   No  Murmurs    Abdomen: Soft, non-tender, nondistended. Bowel sounds present. No organomegaly. Genitourinary: defered   Neuromuscular:      PERRL, EOMI  R facial droop  Able to identify, recall repeat. .   LUE     Shoulder abduction   5-/5              Elbow flexion:  5- /5               Wrist extension:  5/5              Finger flexion;   5/5  RUE    Shoulder abduction:0 /5                Elbow flexion: 0/5    Wrist extension: 0 /5   Finger flexion:  0 /5   ADMQ:   0/5  LLE     Hip flexion:   4+/5              Knee extension:  5- /5    Ankle dorsiflexion:  5 /5   Ankle plantarflexion:   5/5        RLE     Hip flexion:  0 /5   Knee extension: 0  /5    Ankle dorsiflexion:  0 /5   Ankle plantarflexion: 0  /5  Sensory - intact grossly, limited exam   no tremors. Skin/extremity: No rashes, no erythema. Calf non tender BLE. Labs/Studies:  Recent Results (from the past 72 hour(s))   CBC WITH AUTOMATED DIFF    Collection Time: 11/15/18 12:06 PM   Result Value Ref Range    WBC 3.7 (L) 4.3 - 11.1 K/uL    RBC 4.60 4.05 - 5.2 M/uL    HGB 13.8 11.7 - 15.4 g/dL    HCT 42.6 35.8 - 46.3 %    MCV 92.6 79.6 - 97.8 FL    MCH 30.0 26.1 - 32.9 PG    MCHC 32.4 31.4 - 35.0 g/dL    RDW 13.4 %    PLATELET 696 057 - 541 K/uL    MPV 9.2 (L) 9.4 - 12.3 FL    ABSOLUTE NRBC 0.00 0.0 - 0.2 K/uL    DF AUTOMATED      NEUTROPHILS 58 43 - 78 %    LYMPHOCYTES 23 13 - 44 %    MONOCYTES 14 (H) 4.0 - 12.0 %    EOSINOPHILS 3 0.5 - 7.8 %    BASOPHILS 1 0.0 - 2.0 %    IMMATURE GRANULOCYTES 1 0.0 - 5.0 %    ABS. NEUTROPHILS 2.2 1.7 - 8.2 K/UL    ABS. LYMPHOCYTES 0.9 0.5 - 4.6 K/UL    ABS. MONOCYTES 0.5 0.1 - 1.3 K/UL    ABS. EOSINOPHILS 0.1 0.0 - 0.8 K/UL    ABS. BASOPHILS 0.0 0.0 - 0.2 K/UL    ABS. IMM.  GRANS. 0.0 0.0 - 0.5 K/UL   METABOLIC PANEL, COMPREHENSIVE    Collection Time: 11/15/18 12:06 PM   Result Value Ref Range    Sodium 131 (L) 136 - 145 mmol/L    Potassium 3.8 3.5 - 5.1 mmol/L    Chloride 100 98 - 107 mmol/L    CO2 23 21 - 32 mmol/L    Anion gap 8 7 - 16 mmol/L    Glucose 98 65 - 100 mg/dL    BUN 18 8 - 23 MG/DL    Creatinine 1.04 (H) 0.6 - 1.0 MG/DL    GFR est AA >60 >60 ml/min/1.73m2    GFR est non-AA 57 (L) >60 ml/min/1.73m2    Calcium 9.0 8.3 - 10.4 MG/DL    Bilirubin, total 0.6 0.2 - 1.1 MG/DL    ALT (SGPT) 23 12 - 65 U/L    AST (SGOT) 17 15 - 37 U/L    Alk.  phosphatase 118 50 - 136 U/L    Protein, total 8.1 6.3 - 8.2 g/dL    Albumin 3.7 3.2 - 4.6 g/dL    Globulin 4.4 (H) 2.3 - 3.5 g/dL    A-G Ratio 0.8 (L) 1.2 - 3.5     EKG, 12 LEAD, INITIAL    Collection Time: 11/15/18 12:11 PM   Result Value Ref Range    Ventricular Rate 84 BPM    Atrial Rate 84 BPM    P-R Interval 136 ms    QRS Duration 70 ms    Q-T Interval 398 ms    QTC Calculation (Bezet) 470 ms    Calculated P Axis 65 degrees    Calculated R Axis -19 degrees    Calculated T Axis 53 degrees    Diagnosis       Normal sinus rhythm  Possible Left atrial enlargement  Anterior infarct , age undetermined  Abnormal ECG  No previous ECGs available  Confirmed by Sorin Moreno MD (), Therese Caballero ([id number]) on 11/15/2018 4:54:32 PM     LIPID PANEL    Collection Time: 11/16/18  4:19 AM   Result Value Ref Range    LIPID PROFILE          Cholesterol, total 211 (H) <200 MG/DL    Triglyceride 47 35 - 150 MG/DL    HDL Cholesterol 60 40 - 60 MG/DL    LDL, calculated 141.6 (H) <100 MG/DL    VLDL, calculated 9.4 6.0 - 23.0 MG/DL    CHOL/HDL Ratio 3.5     HEMOGLOBIN A1C WITH EAG    Collection Time: 11/16/18  4:19 AM   Result Value Ref Range    Hemoglobin A1c 5.6 4.8 - 6.0 %    Est. average glucose 104 mg/dL   METABOLIC PANEL, BASIC    Collection Time: 11/16/18  4:19 AM   Result Value Ref Range    Sodium 134 (L) 136 - 145 mmol/L    Potassium 3.3 (L) 3.5 - 5.1 mmol/L    Chloride 102 98 - 107 mmol/L    CO2 24 21 - 32 mmol/L    Anion gap 8 7 - 16 mmol/L    Glucose 97 65 - 100 mg/dL    BUN 19 8 - 23 MG/DL    Creatinine 0.86 0.6 - 1.0 MG/DL    GFR est AA >60 >60 ml/min/1.73m2    GFR est non-AA >60 >60 ml/min/1.73m2    Calcium 8.7 8.3 - 10.4 MG/DL   CBC WITH AUTOMATED DIFF    Collection Time: 11/16/18  4:19 AM   Result Value Ref Range    WBC 5.3 4.3 - 11.1 K/uL    RBC 4.20 4.05 - 5.2 M/uL    HGB 12.7 11.7 - 15.4 g/dL    HCT 37.7 35.8 - 46.3 %    MCV 89.8 79.6 - 97.8 FL    MCH 30.2 26.1 - 32.9 PG    MCHC 33.7 31.4 - 35.0 g/dL    RDW 13.3 % PLATELET 446 016 - 138 K/uL    MPV 9.6 9.4 - 12.3 FL    ABSOLUTE NRBC 0.00 0.0 - 0.2 K/uL    DF AUTOMATED      NEUTROPHILS 69 43 - 78 %    LYMPHOCYTES 18 13 - 44 %    MONOCYTES 11 4.0 - 12.0 %    EOSINOPHILS 1 0.5 - 7.8 %    BASOPHILS 1 0.0 - 2.0 %    IMMATURE GRANULOCYTES 0 0.0 - 5.0 %    ABS. NEUTROPHILS 3.6 1.7 - 8.2 K/UL    ABS. LYMPHOCYTES 1.0 0.5 - 4.6 K/UL    ABS. MONOCYTES 0.6 0.1 - 1.3 K/UL    ABS. EOSINOPHILS 0.0 0.0 - 0.8 K/UL    ABS. BASOPHILS 0.0 0.0 - 0.2 K/UL    ABS. IMM. GRANS. 0.0 0.0 - 0.5 K/UL       Functional Assessment:  Reviewed participation and progress in therapies  Gross Assessment  AROM: Grossly decreased, non-functional(RUE) (11/16/18 1017)  PROM: Within functional limits(BUE) (11/16/18 1017)  Strength: Grossly decreased, non-functional(RUE 0/5, LUE 5/5) (11/16/18 1017)  Coordination: Grossly decreased, non-functional(RUE ) (11/16/18 1017)  Tone: Abnormal(RUE) (11/16/18 1017)  Sensation: Intact(light touch and deep pressure BUE) (11/16/18 1017)   Bed Mobility  Rolling: Maximum assistance (11/16/18 1017)  Supine to Sit: Maximum assistance;Assist x2 (11/16/18 1017)  Sit to Supine: Maximum assistance;Assist x2 (11/16/18 1017)  Scooting: Maximum assistance (11/16/18 1017)   Balance  Sitting: Impaired (11/16/18 1017)  Sitting - Static: Poor (constant support) (11/16/18 1017)  Sitting - Dynamic: Poor (constant support) (11/16/18 1017)           Toileting  Toileting Assistance:  Total assistance(dependent) (11/16/18 1017)   Bed/Mat Mobility  Rolling: Maximum assistance (11/16/18 1017)  Supine to Sit: Maximum assistance;Assist x2 (11/16/18 1017)  Sit to Supine: Maximum assistance;Assist x2 (11/16/18 1017)  Sit to Stand: (NT) (11/16/18 1017)  Scooting: Maximum assistance (11/16/18 1017)     Ambulation:       Impression/Plan:     Principal Problem:    [facility] (11/15/2018)    Active Problems:    Hypertension (11/15/2018)      Tobacco use (11/15/2018) Hyponatremia (11/15/2018)      Leukopenia (11/15/2018)        Current Facility-Administered Medications   Medication Dose Route Frequency Provider Last Rate Last Dose    influenza vaccine 2018-19 (6 mos+)(PF) (FLUARIX QUAD/FLULAVAL QUAD) injection 0.5 mL  0.5 mL IntraMUSCular PRIOR TO DISCHARGE Cori Lua MD        sodium chloride (NS) flush 5-10 mL  5-10 mL IntraVENous Q8H Jono James MD   Stopped at 11/16/18 0600    sodium chloride (NS) flush 5-10 mL  5-10 mL IntraVENous PRN Cori Lua MD        ondansetron (ZOFRAN) injection 4 mg  4 mg IntraVENous Q6H PRN Cori Lua MD        aspirin chewable tablet 81 mg  81 mg Oral DAILY Cori Lua MD   81 mg at 11/16/18 4611    atorvastatin (LIPITOR) tablet 40 mg  40 mg Oral QHS Rosio Lua MD   40 mg at 11/16/18 0035    acetaminophen (TYLENOL) tablet 650 mg  650 mg Oral Q6H PRN Cori Lua MD        enoxaparin (LOVENOX) injection 40 mg  40 mg SubCUTAneous Q24H Rosio Lua MD   40 mg at 11/16/18 0825    nicotine (NICODERM CQ) 21 mg/24 hr patch 1 Patch  1 Patch TransDERmal DAILY Cori Lua MD   1 Patch at 11/15/18 2130    albuterol (PROVENTIL VENTOLIN) nebulizer solution 2.5 mg  2.5 mg Nebulization Q4H PRN Cori Lua MD        tuberculin injection 5 Units  5 Units IntraDERMal ONCE Cori Lua MD   5 Units at 11/16/18 0046    hydrALAZINE (APRESOLINE) 20 mg/mL injection 10 mg  10 mg IntraVENous Q6H PRN Cori Lua MD   10 mg at 11/16/18 0825    0.9% sodium chloride infusion  75 mL/hr IntraVENous CONTINUOUS Cori Lua MD 75 mL/hr at 11/16/18 0036 75 mL/hr at 11/16/18 0036          Signed By: Shedrick Burkitt, MD     November 16, 2018

## 2018-11-17 LAB
AMPHET UR QL SCN: NEGATIVE
ANION GAP SERPL CALC-SCNC: 8 MMOL/L (ref 7–16)
APPEARANCE UR: CLEAR
BARBITURATES UR QL SCN: NEGATIVE
BENZODIAZ UR QL: NEGATIVE
BILIRUB UR QL: NEGATIVE
BUN SERPL-MCNC: 11 MG/DL (ref 8–23)
CALCIUM SERPL-MCNC: 8.4 MG/DL (ref 8.3–10.4)
CANNABINOIDS UR QL SCN: NEGATIVE
CHLORIDE SERPL-SCNC: 104 MMOL/L (ref 98–107)
CO2 SERPL-SCNC: 24 MMOL/L (ref 21–32)
COCAINE UR QL SCN: NEGATIVE
COLOR UR: YELLOW
CREAT SERPL-MCNC: 0.74 MG/DL (ref 0.6–1)
GLUCOSE BLD STRIP.AUTO-MCNC: 122 MG/DL (ref 65–100)
GLUCOSE BLD STRIP.AUTO-MCNC: 96 MG/DL (ref 65–100)
GLUCOSE SERPL-MCNC: 97 MG/DL (ref 65–100)
GLUCOSE UR STRIP.AUTO-MCNC: NEGATIVE MG/DL
HGB UR QL STRIP: NEGATIVE
KETONES UR QL STRIP.AUTO: NEGATIVE MG/DL
LEUKOCYTE ESTERASE UR QL STRIP.AUTO: NEGATIVE
METHADONE UR QL: NEGATIVE
MM INDURATION POC: 0 MM (ref 0–5)
NITRITE UR QL STRIP.AUTO: NEGATIVE
OPIATES UR QL: NEGATIVE
PCP UR QL: NEGATIVE
PH UR STRIP: 6 [PH] (ref 5–9)
POTASSIUM SERPL-SCNC: 3.7 MMOL/L (ref 3.5–5.1)
PPD POC: NEGATIVE NEGATIVE
PROT UR STRIP-MCNC: NEGATIVE MG/DL
SODIUM SERPL-SCNC: 136 MMOL/L (ref 136–145)
SP GR UR REFRACTOMETRY: 1.02 (ref 1–1.02)
UROBILINOGEN UR QL STRIP.AUTO: 0.2 EU/DL (ref 0.2–1)

## 2018-11-17 PROCEDURE — 65270000029 HC RM PRIVATE

## 2018-11-17 PROCEDURE — 80307 DRUG TEST PRSMV CHEM ANLYZR: CPT

## 2018-11-17 PROCEDURE — 36415 COLL VENOUS BLD VENIPUNCTURE: CPT

## 2018-11-17 PROCEDURE — 80048 BASIC METABOLIC PNL TOTAL CA: CPT

## 2018-11-17 PROCEDURE — 74011250637 HC RX REV CODE- 250/637: Performed by: INTERNAL MEDICINE

## 2018-11-17 PROCEDURE — 81003 URINALYSIS AUTO W/O SCOPE: CPT

## 2018-11-17 PROCEDURE — 82962 GLUCOSE BLOOD TEST: CPT

## 2018-11-17 PROCEDURE — 74011250637 HC RX REV CODE- 250/637: Performed by: NURSE PRACTITIONER

## 2018-11-17 PROCEDURE — 74011250636 HC RX REV CODE- 250/636: Performed by: INTERNAL MEDICINE

## 2018-11-17 PROCEDURE — 77030020263 HC SOL INJ SOD CL0.9% LFCR 1000ML

## 2018-11-17 RX ORDER — LORAZEPAM 0.5 MG/1
0.5 TABLET ORAL
Status: DISCONTINUED | OUTPATIENT
Start: 2018-11-17 | End: 2018-11-21 | Stop reason: HOSPADM

## 2018-11-17 RX ORDER — GUAIFENESIN 600 MG/1
600 TABLET, EXTENDED RELEASE ORAL EVERY 12 HOURS
Status: DISCONTINUED | OUTPATIENT
Start: 2018-11-17 | End: 2018-11-19

## 2018-11-17 RX ORDER — CYCLOBENZAPRINE HCL 10 MG
10 TABLET ORAL
Status: DISCONTINUED | OUTPATIENT
Start: 2018-11-17 | End: 2018-11-21 | Stop reason: HOSPADM

## 2018-11-17 RX ORDER — LORAZEPAM 0.5 MG/1
TABLET ORAL
Status: ACTIVE
Start: 2018-11-17 | End: 2018-11-17

## 2018-11-17 RX ADMIN — AMLODIPINE BESYLATE 5 MG: 5 TABLET ORAL at 09:03

## 2018-11-17 RX ADMIN — ENOXAPARIN SODIUM 40 MG: 40 INJECTION, SOLUTION INTRAVENOUS; SUBCUTANEOUS at 09:03

## 2018-11-17 RX ADMIN — GUAIFENESIN 600 MG: 600 TABLET, EXTENDED RELEASE ORAL at 20:43

## 2018-11-17 RX ADMIN — Medication 5 ML: at 13:34

## 2018-11-17 RX ADMIN — ASPIRIN 81 MG 81 MG: 81 TABLET ORAL at 09:03

## 2018-11-17 RX ADMIN — HYDRALAZINE HYDROCHLORIDE 10 MG: 20 INJECTION INTRAMUSCULAR; INTRAVENOUS at 20:06

## 2018-11-17 RX ADMIN — Medication 10 ML: at 20:06

## 2018-11-17 RX ADMIN — HYDRALAZINE HYDROCHLORIDE 10 MG: 20 INJECTION INTRAMUSCULAR; INTRAVENOUS at 12:04

## 2018-11-17 RX ADMIN — LORAZEPAM 0.5 MG: 0.5 TABLET ORAL at 01:15

## 2018-11-17 RX ADMIN — LORAZEPAM 0.5 MG: 0.5 TABLET ORAL at 21:45

## 2018-11-17 RX ADMIN — SODIUM CHLORIDE 75 ML/HR: 900 INJECTION, SOLUTION INTRAVENOUS at 21:46

## 2018-11-17 RX ADMIN — SODIUM CHLORIDE 75 ML/HR: 900 INJECTION, SOLUTION INTRAVENOUS at 09:03

## 2018-11-17 RX ADMIN — ATORVASTATIN CALCIUM 40 MG: 40 TABLET, FILM COATED ORAL at 20:43

## 2018-11-17 NOTE — PROGRESS NOTES
Hospitalist Progress Note Subjective:  
Daily Progress Note: 11/17/2018 1:07 PM 
 
Patient with baseline learning disorder presented to ER 11/15 with difficulty walking for the prior 2.5 days with right side weakness without any additional symptoms. Several falls on day prior to presentation. Leukopenia with admission WBC: 3.7 and Na+: 131. MRI done in ER with small left parietal infarct. 11/16:  IR[facility] referral.  Speech now slurred, SLP in with right side lingual deviation noted. Downgrade to mechanical soft and thin liquids. OT, PT evals, needs maximal assist, right upper extremity severely impaired at present, patient is right hand dominant. Some sensation intact. Dr Clyde Landau MD in for eval.  Carotid U/S as noted below. Begun on ASA and statin. 11/17:  Able to wiggle right fingers very slightly. Continues to be unable to move right arm and leg. Speech remains slurred, but able to understand. Supportive niece at bedside. Blood pressure up to 181/115. May need to increase po antihypertensives tomorrow. ADDITIONAL HISTORY:  HTN, tobacco abuse Current Facility-Administered Medications Medication Dose Route Frequency  LORazepam (ATIVAN) tablet 0.5 mg  0.5 mg Oral Q6H PRN  
 influenza vaccine 2018-19 (6 mos+)(PF) (FLUARIX QUAD/FLULAVAL QUAD) injection 0.5 mL  0.5 mL IntraMUSCular PRIOR TO DISCHARGE  amLODIPine (NORVASC) tablet 5 mg  5 mg Oral DAILY  sodium chloride (NS) flush 5-10 mL  5-10 mL IntraVENous Q8H  
 sodium chloride (NS) flush 5-10 mL  5-10 mL IntraVENous PRN  
 ondansetron (ZOFRAN) injection 4 mg  4 mg IntraVENous Q6H PRN  
 aspirin chewable tablet 81 mg  81 mg Oral DAILY  atorvastatin (LIPITOR) tablet 40 mg  40 mg Oral QHS  acetaminophen (TYLENOL) tablet 650 mg  650 mg Oral Q6H PRN  
 enoxaparin (LOVENOX) injection 40 mg  40 mg SubCUTAneous Q24H  
 nicotine (NICODERM CQ) 21 mg/24 hr patch 1 Patch  1 Patch TransDERmal DAILY  albuterol (PROVENTIL VENTOLIN) nebulizer solution 2.5 mg  2.5 mg Nebulization Q4H PRN  
 hydrALAZINE (APRESOLINE) 20 mg/mL injection 10 mg  10 mg IntraVENous Q6H PRN  
 0.9% sodium chloride infusion  75 mL/hr IntraVENous CONTINUOUS Review of Systems A comprehensive review of systems was negative except for that written in the HPI. Objective:  
 
Visit Vitals BP (!) 181/115 (BP 1 Location: Right arm, BP Patient Position: At rest) Comment: Told the RN that the bp is high 181/115 Pulse (!) 103 Temp 98 °F (36.7 °C) Resp 20 SpO2 96% Breastfeeding? No  
   O2 Device: Room air Temp (24hrs), Av.1 °F (36.7 °C), Min:97.6 °F (36.4 °C), Max:99 °F (37.2 °C) 
 
 07 -  1900 In: 120 [P.O.:120] Out: -  
11/15 190 -  0700 In: 960 [P.O.:960] Out: - General appearance: Oriented and alert, cooperative, working with PT. Complains of back pain, unable to get comfortable. Restless, readjusting right arm frequently. Niece at bedside. Head: Normocephalic, without obvious abnormality, atraumatic Eyes: conjunctivae/corneas clear. PERRL Neck: supple, symmetrical, trachea midline, no carotid bruit and no JVD Lungs: Loose cough with small amounts thick sputum, scattered wheezes, otherwise clear to auscultation bilaterally Heart: regular rate and rhythm, S1, S2 normal, no murmur, click, rub or gallop Abdomen: soft, non-tender. Bowel sounds normal. No masses,  no organomegaly Extremities: right arm and leg with very faint motion of digits. Unable to . Feeling some sensation. Left side extremities normal, atraumatic, no cyanosis or edema Skin: Skin color, texture, turgor normal. No rashes or lesions Neurologic: Left side grossly normal.  Right as above. Additional comments: Notes,orders, test results, vitals reviewed Data Review Recent Results (from the past 24 hour(s)) URINALYSIS W/ RFLX MICROSCOPIC Collection Time: 18 12:34 AM  
Result Value Ref Range Color YELLOW Appearance CLEAR Specific gravity 1.017 1.001 - 1.023    
 pH (UA) 6.0 5.0 - 9.0 Protein NEGATIVE  NEG mg/dL Glucose NEGATIVE  mg/dL Ketone NEGATIVE  NEG mg/dL Bilirubin NEGATIVE  NEG Blood NEGATIVE  NEG Urobilinogen 0.2 0.2 - 1.0 EU/dL Nitrites NEGATIVE  NEG Leukocyte Esterase NEGATIVE  NEG    
DRUG SCREEN, URINE Collection Time: 11/17/18 12:35 AM  
Result Value Ref Range PCP(PHENCYCLIDINE) NEGATIVE BENZODIAZEPINES NEGATIVE     
 COCAINE NEGATIVE AMPHETAMINES NEGATIVE METHADONE NEGATIVE     
 THC (TH-CANNABINOL) NEGATIVE     
 OPIATES NEGATIVE     
 BARBITURATES NEGATIVE     
PLEASE READ & DOCUMENT PPD TEST IN 24 HRS Collection Time: 11/17/18 12:48 AM  
Result Value Ref Range PPD Negative Negative  
 mm Induration 0 mm METABOLIC PANEL, BASIC Collection Time: 11/17/18  4:39 AM  
Result Value Ref Range Sodium 136 136 - 145 mmol/L Potassium 3.7 3.5 - 5.1 mmol/L Chloride 104 98 - 107 mmol/L  
 CO2 24 21 - 32 mmol/L Anion gap 8 7 - 16 mmol/L Glucose 97 65 - 100 mg/dL BUN 11 8 - 23 MG/DL Creatinine 0.74 0.6 - 1.0 MG/DL  
 GFR est AA >60 >60 ml/min/1.73m2 GFR est non-AA >60 >60 ml/min/1.73m2 Calcium 8.4 8.3 - 10.4 MG/DL  
GLUCOSE, POC Collection Time: 11/17/18  7:34 AM  
Result Value Ref Range Glucose (POC) 122 (H) 65 - 100 mg/dL GLUCOSE, POC Collection Time: 11/17/18 11:51 AM  
Result Value Ref Range Glucose (POC) 96 65 - 100 mg/dL CT HEAD: 11/15: No acute intraparenchymal hemorrhage or abnormal extra-axial fluid collection. The ventricles are normal size. Fairly extensive white matter low attenuation is present, especially frontal lobe nonspecific, likely chronic small vessel disease. Old small pontine infarct on the right. No midline shift 
mass effect. Included portion of the paranasal sinuses and orbits grossly unremarkable. IMPRESSION:  Negative for acute intracranial hemorrhage. Chronic changes. MRI is more sensitive for acute infarct. 
  
MRI BRAIN:  11/15: There is a small acute lacunar infarcts in the left corona radiata. There is no associated hemorrhage. No other areas of acute infarction are seen. There is an old lacunar infarct in the right midbrain. There is moderate chronic change in the cerebellum and periventricular white matter. .  The ventricles are normal in size. There are no extra-axial fluid collections. There are no intracranial masses. There are no bony lesions. The sinuses are clear. IMPRESSION: Small acute left parietal white matter infarct. No evidence of hemorrhage. CXR: 11/15: The lungs are clear. There are no infiltrates or effusions. The heart size is normal.  The aorta is slightly prominent and atherosclerotic. IMPRESSION: No acute findings in the chest 
 
CAROTID DOPPLERS: 11/15: There is significant calcified plaque in the right carotid bulb, but no significant stenosis based on flow velocities. There is normal antegrade flow 
in the right vertebral artery. There is also significant calcified plaque in the left carotid bulb and proximal left internal carotid artery. Again, there is no significant increase in flow velocity. There is normal antegrade flow in the left vertebral artery. IMPRESSION: Extensive atherosclerosis in the carotid arteries bilaterally. No utrasound evidence of significant carotid stenosis. Assessment/Plan:  
Acute left side CVA with right side paralysis Stroke protocols Hypertensive urgency Permissive hypertension Prn hydralazine 
 norvasc 5 mg added 11/16 Longstanding, ongoing Tobacco use Nicotine patch Hyponatremia: resolving with IVF Leukopenia of unclear etiology. monitor Care Plan discussed with: Patient/Family and Nurse Signed By: aJke Wood NP November 17, 2018

## 2018-11-17 NOTE — PROGRESS NOTES
Ischemic Stroke without Activase/TIA   
VTE Prophylaxis: Yes: lovenox 
  
Antiplatelet: No 
  
Statin if LDL Greater Than or Equal to100: Yes: liptor 
  
BP Parameters: Less Than 220/120 for 24 hours, then consult MD for parameters 
  
Controlled With: PRN - IV 
  
Dysphagia Screen Completed: Yes: Pass Dysphagia Screening Vocal Quality/Secretions: (!) Abnormal 
History of Dysphagia: No 
O2 Saturation: Normal 
Alertness: Normal 
Pre-Swallow Assessment Score: 1 Purees: No difficulty noted Water by Cup: No difficulty noted Water by Straw: No difficulty noted 
  
Patient has PEG, NG Tube, Feeding Tube: No 
  
Medication orders per above route: Yes 
  
Nutrition Status: PO 
  
NIH Stroke Scale Complete: Yes: 4 
  
Frequency of Vital Signs: Every 4 hours Frequency of Neuro Checks: Every 4 hours 
  
Daily Education/Care Plan Updated: Yes Lori Scanlon

## 2018-11-17 NOTE — PROGRESS NOTES
Problem: Falls - Risk of 
Goal: *Absence of Falls Document [facility] Fall Risk and appropriate interventions in the flowsheet. Outcome: Progressing Towards Goal 
Fall Risk Interventions: 
Mobility Interventions: Bed/chair exit alarm, Communicate number of staff needed for ambulation/transfer, Patient to call before getting OOB, Utilize walker, cane, or other assistive device Medication Interventions: Bed/chair exit alarm, Patient to call before getting OOB Elimination Interventions: Call light in reach History of Falls Interventions: Bed/chair exit alarm Problem: Pressure Injury - Risk of 
Goal: *Prevention of pressure injury Document Moshe Scale and appropriate interventions in the flowsheet. Outcome: Progressing Towards Goal 
Pressure Injury Interventions: 
Sensory Interventions: Assess changes in LOC, Assess need for specialty bed, Check visual cues for pain, Pad between skin to skin, Minimize linen layers Moisture Interventions: Absorbent underpads, Limit adult briefs Activity Interventions: PT/OT evaluation Mobility Interventions: Pressure redistribution bed/mattress (bed type), HOB 30 degrees or less Nutrition Interventions: Document food/fluid/supplement intake Friction and Shear Interventions: HOB 30 degrees or less

## 2018-11-17 NOTE — PROGRESS NOTES
Problem: Falls - Risk of 
Goal: *Absence of Falls Document [facility] Deal Fall Risk and appropriate interventions in the flowsheet. Outcome: Progressing Towards Goal 
Fall Risk Interventions: 
Mobility Interventions: Patient to call before getting OOB, PT Consult for mobility concerns, PT Consult for assist device competence, Strengthening exercises (ROM-active/passive), Communicate number of staff needed for ambulation/transfer, Bed/chair exit alarm Medication Interventions: Bed/chair exit alarm, Evaluate medications/consider consulting pharmacy, Patient to call before getting OOB, Teach patient to arise slowly Elimination Interventions: Call light in reach, Bed/chair exit alarm, Patient to call for help with toileting needs History of Falls Interventions: Bed/chair exit alarm, Door open when patient unattended, Evaluate medications/consider consulting pharmacy, Investigate reason for fall Problem: Pressure Injury - Risk of 
Goal: *Prevention of pressure injury Document Moshe Scale and appropriate interventions in the flowsheet. Outcome: Progressing Towards Goal 
Pressure Injury Interventions: 
Sensory Interventions: Assess changes in LOC, Assess need for specialty bed, Check visual cues for pain, Pad between skin to skin, Minimize linen layers Moisture Interventions: Moisture barrier, Minimize layers, Maintain skin hydration (lotion/cream), Limit adult briefs, Check for incontinence Q2 hours and as needed, Apply protective barrier, creams and emollients, Absorbent underpads Activity Interventions: Increase time out of bed, Pressure redistribution bed/mattress(bed type), PT/OT evaluation Mobility Interventions: HOB 30 degrees or less, Pressure redistribution bed/mattress (bed type), PT/OT evaluation Nutrition Interventions: Offer support with meals,snacks and hydration, Document food/fluid/supplement intake Friction and Shear Interventions: HOB 30 degrees or less, Lift sheet, Lift team/patient mobility team

## 2018-11-17 NOTE — PROGRESS NOTES
Pt very agitated and restless tonight and complains of spasms in her LLE. Dr. Mikayla Mckeon notified. New orders received.

## 2018-11-18 LAB
ANION GAP SERPL CALC-SCNC: 7 MMOL/L (ref 7–16)
BASOPHILS # BLD: 0 K/UL (ref 0–0.2)
BASOPHILS NFR BLD: 1 % (ref 0–2)
BUN SERPL-MCNC: 8 MG/DL (ref 8–23)
CALCIUM SERPL-MCNC: 8.1 MG/DL (ref 8.3–10.4)
CHLORIDE SERPL-SCNC: 104 MMOL/L (ref 98–107)
CO2 SERPL-SCNC: 24 MMOL/L (ref 21–32)
CREAT SERPL-MCNC: 0.65 MG/DL (ref 0.6–1)
DIFFERENTIAL METHOD BLD: ABNORMAL
EOSINOPHIL # BLD: 0.2 K/UL (ref 0–0.8)
EOSINOPHIL NFR BLD: 5 % (ref 0.5–7.8)
ERYTHROCYTE [DISTWIDTH] IN BLOOD BY AUTOMATED COUNT: 13.7 %
GLUCOSE BLD STRIP.AUTO-MCNC: 167 MG/DL (ref 65–100)
GLUCOSE SERPL-MCNC: 102 MG/DL (ref 65–100)
HCT VFR BLD AUTO: 36.2 % (ref 35.8–46.3)
HGB BLD-MCNC: 12.1 G/DL (ref 11.7–15.4)
IMM GRANULOCYTES # BLD: 0 K/UL (ref 0–0.5)
IMM GRANULOCYTES NFR BLD AUTO: 0 % (ref 0–5)
LYMPHOCYTES # BLD: 1.1 K/UL (ref 0.5–4.6)
LYMPHOCYTES NFR BLD: 23 % (ref 13–44)
MAGNESIUM SERPL-MCNC: 2 MG/DL (ref 1.8–2.4)
MCH RBC QN AUTO: 30.4 PG (ref 26.1–32.9)
MCHC RBC AUTO-ENTMCNC: 33.4 G/DL (ref 31.4–35)
MCV RBC AUTO: 91 FL (ref 79.6–97.8)
MONOCYTES # BLD: 0.6 K/UL (ref 0.1–1.3)
MONOCYTES NFR BLD: 13 % (ref 4–12)
NEUTS SEG # BLD: 2.7 K/UL (ref 1.7–8.2)
NEUTS SEG NFR BLD: 58 % (ref 43–78)
NRBC # BLD: 0 K/UL (ref 0–0.2)
PLATELET # BLD AUTO: 290 K/UL (ref 150–450)
PMV BLD AUTO: 9.1 FL (ref 9.4–12.3)
POTASSIUM SERPL-SCNC: 3.4 MMOL/L (ref 3.5–5.1)
RBC # BLD AUTO: 3.98 M/UL (ref 4.05–5.2)
SODIUM SERPL-SCNC: 135 MMOL/L (ref 136–145)
WBC # BLD AUTO: 4.7 K/UL (ref 4.3–11.1)

## 2018-11-18 PROCEDURE — 83735 ASSAY OF MAGNESIUM: CPT

## 2018-11-18 PROCEDURE — 94760 N-INVAS EAR/PLS OXIMETRY 1: CPT

## 2018-11-18 PROCEDURE — 94640 AIRWAY INHALATION TREATMENT: CPT

## 2018-11-18 PROCEDURE — 82962 GLUCOSE BLOOD TEST: CPT

## 2018-11-18 PROCEDURE — 74011250637 HC RX REV CODE- 250/637: Performed by: NURSE PRACTITIONER

## 2018-11-18 PROCEDURE — 80048 BASIC METABOLIC PNL TOTAL CA: CPT

## 2018-11-18 PROCEDURE — 77030020263 HC SOL INJ SOD CL0.9% LFCR 1000ML

## 2018-11-18 PROCEDURE — 74011000250 HC RX REV CODE- 250: Performed by: NURSE PRACTITIONER

## 2018-11-18 PROCEDURE — 74011250636 HC RX REV CODE- 250/636: Performed by: INTERNAL MEDICINE

## 2018-11-18 PROCEDURE — 85025 COMPLETE CBC W/AUTO DIFF WBC: CPT

## 2018-11-18 PROCEDURE — 65270000029 HC RM PRIVATE

## 2018-11-18 PROCEDURE — 36415 COLL VENOUS BLD VENIPUNCTURE: CPT

## 2018-11-18 PROCEDURE — 74011250637 HC RX REV CODE- 250/637: Performed by: INTERNAL MEDICINE

## 2018-11-18 RX ORDER — METOPROLOL TARTRATE 25 MG/1
12.5 TABLET, FILM COATED ORAL EVERY 12 HOURS
Status: DISCONTINUED | OUTPATIENT
Start: 2018-11-18 | End: 2018-11-21 | Stop reason: HOSPADM

## 2018-11-18 RX ORDER — AMLODIPINE BESYLATE 10 MG/1
10 TABLET ORAL DAILY
Status: DISCONTINUED | OUTPATIENT
Start: 2018-11-19 | End: 2018-11-21 | Stop reason: HOSPADM

## 2018-11-18 RX ORDER — LEVALBUTEROL INHALATION SOLUTION 0.63 MG/3ML
0.63 SOLUTION RESPIRATORY (INHALATION)
Status: DISCONTINUED | OUTPATIENT
Start: 2018-11-18 | End: 2018-11-21 | Stop reason: HOSPADM

## 2018-11-18 RX ORDER — ALBUTEROL SULFATE 0.83 MG/ML
2.5 SOLUTION RESPIRATORY (INHALATION)
Status: DISCONTINUED | OUTPATIENT
Start: 2018-11-18 | End: 2018-11-18

## 2018-11-18 RX ORDER — AMLODIPINE BESYLATE 5 MG/1
5 TABLET ORAL
Status: COMPLETED | OUTPATIENT
Start: 2018-11-18 | End: 2018-11-18

## 2018-11-18 RX ORDER — LEVALBUTEROL INHALATION SOLUTION 0.63 MG/3ML
0.63 SOLUTION RESPIRATORY (INHALATION)
Status: DISCONTINUED | OUTPATIENT
Start: 2018-11-18 | End: 2018-11-18

## 2018-11-18 RX ORDER — BENZONATATE 100 MG/1
100 CAPSULE ORAL
Status: DISCONTINUED | OUTPATIENT
Start: 2018-11-18 | End: 2018-11-21 | Stop reason: HOSPADM

## 2018-11-18 RX ORDER — METOPROLOL TARTRATE 25 MG/1
12.5 TABLET, FILM COATED ORAL ONCE
Status: COMPLETED | OUTPATIENT
Start: 2018-11-18 | End: 2018-11-18

## 2018-11-18 RX ADMIN — HYDRALAZINE HYDROCHLORIDE 10 MG: 20 INJECTION INTRAMUSCULAR; INTRAVENOUS at 06:13

## 2018-11-18 RX ADMIN — LORAZEPAM 0.5 MG: 0.5 TABLET ORAL at 21:34

## 2018-11-18 RX ADMIN — GUAIFENESIN 600 MG: 600 TABLET, EXTENDED RELEASE ORAL at 08:28

## 2018-11-18 RX ADMIN — ASPIRIN 81 MG 81 MG: 81 TABLET ORAL at 08:28

## 2018-11-18 RX ADMIN — ATORVASTATIN CALCIUM 40 MG: 40 TABLET, FILM COATED ORAL at 21:34

## 2018-11-18 RX ADMIN — Medication 10 ML: at 21:35

## 2018-11-18 RX ADMIN — SODIUM CHLORIDE 75 ML/HR: 900 INJECTION, SOLUTION INTRAVENOUS at 13:21

## 2018-11-18 RX ADMIN — Medication 5 ML: at 14:50

## 2018-11-18 RX ADMIN — Medication 10 ML: at 06:13

## 2018-11-18 RX ADMIN — GUAIFENESIN 600 MG: 600 TABLET, EXTENDED RELEASE ORAL at 21:34

## 2018-11-18 RX ADMIN — ALBUTEROL SULFATE 2.5 MG: 2.5 SOLUTION RESPIRATORY (INHALATION) at 14:02

## 2018-11-18 RX ADMIN — METOPROLOL TARTRATE 12.5 MG: 25 TABLET ORAL at 14:48

## 2018-11-18 RX ADMIN — METOPROLOL TARTRATE 12.5 MG: 25 TABLET ORAL at 21:33

## 2018-11-18 RX ADMIN — ENOXAPARIN SODIUM 40 MG: 40 INJECTION, SOLUTION INTRAVENOUS; SUBCUTANEOUS at 08:28

## 2018-11-18 RX ADMIN — AMLODIPINE BESYLATE 5 MG: 5 TABLET ORAL at 08:28

## 2018-11-18 RX ADMIN — AMLODIPINE BESYLATE 5 MG: 5 TABLET ORAL at 14:48

## 2018-11-18 NOTE — PROGRESS NOTES
Problem: Falls - Risk of 
Goal: *Absence of Falls Document Savanah Mancini Fall Risk and appropriate interventions in the flowsheet. Outcome: Progressing Towards Goal 
Fall Risk Interventions: 
Mobility Interventions: Bed/chair exit alarm, Patient to call before getting OOB, Communicate number of staff needed for ambulation/transfer Medication Interventions: Patient to call before getting OOB Elimination Interventions: Call light in reach, Patient to call for help with toileting needs History of Falls Interventions: Bed/chair exit alarm Problem: Pressure Injury - Risk of 
Goal: *Prevention of pressure injury Document Moshe Scale and appropriate interventions in the flowsheet. Outcome: Progressing Towards Goal 
Pressure Injury Interventions: 
Sensory Interventions: Assess changes in LOC, Check visual cues for pain Moisture Interventions: Absorbent underpads, Limit adult briefs Activity Interventions: PT/OT evaluation, Increase time out of bed, Pressure redistribution bed/mattress(bed type) Mobility Interventions: HOB 30 degrees or less, Pressure redistribution bed/mattress (bed type), PT/OT evaluation Nutrition Interventions: Offer support with meals,snacks and hydration Friction and Shear Interventions: HOB 30 degrees or less

## 2018-11-18 NOTE — PROGRESS NOTES
Respiratory Care Services Policy Number: [id number] Title: Aerosolized Medication Protocol Effective Date: 10/1998 Revised Date: 06/2013, 03/2016 Reviewed Date: 05/2014/ 03/2015 , 06/2017 I. Policy: The Aerosolized Medication Protocol shall by implemented by Respiratory Care              Practitioners (RCP) for patients with orders to receive aerosol therapy with medication. II. Purpose: To open and maintain obstructed airways, the RCP, will utilize the following  
protocol to select the indicated aerosolized medication(s) and determine the most effective method of delivery to the patient. III. Patient Type: All patients who are determined to meet aerosolized medication criteria as  
       outlined in this protocol. IV. Responsibility: Director, [address], registered Respiratory Care                                                     Practitioners (RCPs) with documented competency in the performance of                                     respiratory therapeutic techniques. V. Equipment needed: A. Stethoscope B. Pulse oximeter C. IPPB machine and circuit D. Aerosol nebulizer E. MDI Inhaler VI. Protocol: A. The following conditions are accepted indications for aerosolized medication therapy. 1. Bronchospasm/wheezing 2. Impaired mucociliary clearance 3. Tracheobronchial mucosal congestion/and laryngeal stridor 4. Diseases which commonly require aerosolized medication therapy include, but are not limited to: 
a. Asthma/reactive airway disease 
b. Bronchitis/emphysema (COPD) c. Cystic fibrosis 
d. Severe laryngitis/tracheitis 
e. Bronchiectasis 
f. Smoke inhalation or chemical trauma to the lung or upper airway 
g. Physical trauma to the upper airway 
h. Laryngotracheobronchitis 
i. Bronchiolitis 
j. Non-specific wheezing B. Indications for bronchodilator medications will include: 
a. Bronchospasm/ wheezing b. Asthma/reactive airway disease 
c. Chronic obstructive pulmonary disease 
d. Obstructive defect on pulmonary function testing C. Administration of medications 1. If a bronchodilator or any other type of respiratory medication is needed, a physician order must be indicated in the medication section in the patients EMR. 2. When the physician specifies the medication and dosage at the time of request, the ordered medication will be used as part of the care plan. D. The following guidelines will be utilized in the evaluation and selection of the         appropriate delivery device for indicated medication(s): 
1. IPPB 
a. Ventilation is inadequate (rapid shallow breathing) b. Refractory atelectasis has developed, other forms of therapy are unsuccessful 
c. Inability to clear secretions 
d. Need to improve lung expansion 2. Unassisted aerosol (UA) is the preferred method of aerosol delivery and indicated if 
a. Ventilation is inadequate 
b. Patient demonstrates wheezing  
c. patient is unable to perform MDI effectively  
d. Patient preference 3. Metered Dose Inhaler (MDI)  
a. Patient is alert/cooperative 
b. Medication(s) available in this delivery method. c. Able to perform 3 second breath hold. d. Patient has demonstrated ability to use MDI effectively 
e. Patient has used MDI therapy previously, either at home or in the hospital. 
f. Note: The only approved inhalers on formulary are albuterol and Spiriva. VII. Guidelines:  
Monitor patients vital signs and evaluate patients clinical status. The need to change medication and/or modality may be indicated by: 1. A pulse greater than 120 bpm, or if a pulse increase of 20 bpm occurs with bronchodilator medications. 2. Significant worsening of dyspnea or wheezing occurring during or within 30 minutes of discontinuing therapy. 3. Worsening of patients sensorium (e.g. patient becomes confused or obtunded, and unable to follow directions). 4. Worsening of patients chest x-ray. 5. Change in sputum (e.g. increased pulmonary infiltrate, which might indicate need for volume expansion therapy). 6. Patient has difficulty coughing up secretions, which might indicate need for acetylcysteine and/or bronchial hygiene therapy. 7. Call physician immediately if dyspnea worsens and is not responsive to modifications allowed by protocol. VIII. Clinical Responsibility: A. The therapy assessment guidelines will be used to evaluate all patients receiving aerosolized medications with the exception of critical care areas. 1. RCPs will perform changes in therapy per protocol. 2. It will be the responsibility of RCP to provide instruction regarding respiratory medications, aerosol therapy and proper MDI technique, as well as, spacer usage to patients ordered MDI therapy. 3. Current therapy that is part of a patients home regimen will not be discontinued. IX. Documentation A. Document assessment findings in the respiratory assessment section of the patients EMR. B. Document changes in therapy per protocol in the respiratory orders section and in the care plan section of the patients EMR. C. Document patient education in the patient education section of the patients EMR. X. Outcome Criteria: A. Relief of wheezes and obstruction B. Improved cough and sputum color and consistency C. Improved chest x-ray D. Improved arterial oxygen tension and or SaO2 
E. Improved Peak Flow on asthmatic patients XI. Related Protocols: A. Respiratory Patient Care Protocols B. Bronchial Hygiene Therapy C. Oxygen Protocol Reference:

## 2018-11-18 NOTE — PROGRESS NOTES
NP notified of elevated pulse, new stat orders for Norvasc and metoprolol, will continue to monitor.

## 2018-11-18 NOTE — PROGRESS NOTES
Ischemic Stroke without Activase/TIA 
 
VTE Prophylaxis: Yes: Lovenox Antiplatelet: No 
 
Statin if LDL Greater Than or Equal to100: Yes: lipitor BP Parameters: Less Than 220/120 for 24 hours, then consult MD for parameters Controlled With: PRN - IV Dysphagia Screen Completed: Yes: Pass Dysphagia Screening Vocal Quality/Secretions: (!) Abnormal 
History of Dysphagia: No 
O2 Saturation: Normal 
Alertness: Normal 
Pre-Swallow Assessment Score: 1 Purees: No difficulty noted Water by Cup: No difficulty noted Water by Straw: No difficulty noted Patient has PEG, NG Tube, Feeding Tube: No 
 
Medication orders per above route: Yes 
 
Nutrition Status: PO 
 
NIH Stroke Scale Complete: Yes: 4 Frequency of Vital Signs: Every 4 hours Frequency of Neuro Checks: Every 4 hours Daily Education/Care Plan Updated: Yes Sorin Arroyo

## 2018-11-19 PROCEDURE — 65270000029 HC RM PRIVATE

## 2018-11-19 PROCEDURE — 92526 ORAL FUNCTION THERAPY: CPT

## 2018-11-19 PROCEDURE — 97110 THERAPEUTIC EXERCISES: CPT

## 2018-11-19 PROCEDURE — 74011250636 HC RX REV CODE- 250/636: Performed by: INTERNAL MEDICINE

## 2018-11-19 PROCEDURE — 94640 AIRWAY INHALATION TREATMENT: CPT

## 2018-11-19 PROCEDURE — 94760 N-INVAS EAR/PLS OXIMETRY 1: CPT

## 2018-11-19 PROCEDURE — 74011000250 HC RX REV CODE- 250: Performed by: INTERNAL MEDICINE

## 2018-11-19 PROCEDURE — 92523 SPEECH SOUND LANG COMPREHEN: CPT

## 2018-11-19 PROCEDURE — 97530 THERAPEUTIC ACTIVITIES: CPT

## 2018-11-19 PROCEDURE — 74011250637 HC RX REV CODE- 250/637: Performed by: INTERNAL MEDICINE

## 2018-11-19 PROCEDURE — 74011250637 HC RX REV CODE- 250/637: Performed by: NURSE PRACTITIONER

## 2018-11-19 RX ORDER — GUAIFENESIN 600 MG/1
1200 TABLET, EXTENDED RELEASE ORAL EVERY 12 HOURS
Status: DISCONTINUED | OUTPATIENT
Start: 2018-11-19 | End: 2018-11-21 | Stop reason: HOSPADM

## 2018-11-19 RX ADMIN — LORAZEPAM 0.5 MG: 0.5 TABLET ORAL at 23:56

## 2018-11-19 RX ADMIN — GUAIFENESIN 600 MG: 600 TABLET, EXTENDED RELEASE ORAL at 08:47

## 2018-11-19 RX ADMIN — Medication 10 ML: at 16:26

## 2018-11-19 RX ADMIN — LEVALBUTEROL HYDROCHLORIDE 0.63 MG: 0.63 SOLUTION RESPIRATORY (INHALATION) at 20:57

## 2018-11-19 RX ADMIN — METOPROLOL TARTRATE 12.5 MG: 25 TABLET ORAL at 08:47

## 2018-11-19 RX ADMIN — Medication 10 ML: at 05:11

## 2018-11-19 RX ADMIN — GUAIFENESIN 1200 MG: 600 TABLET, EXTENDED RELEASE ORAL at 21:33

## 2018-11-19 RX ADMIN — ATORVASTATIN CALCIUM 40 MG: 40 TABLET, FILM COATED ORAL at 21:33

## 2018-11-19 RX ADMIN — AMLODIPINE BESYLATE 10 MG: 10 TABLET ORAL at 08:48

## 2018-11-19 RX ADMIN — ACETAMINOPHEN 650 MG: 325 TABLET, FILM COATED ORAL at 08:48

## 2018-11-19 RX ADMIN — ASPIRIN 81 MG 81 MG: 81 TABLET ORAL at 08:48

## 2018-11-19 RX ADMIN — LEVALBUTEROL HYDROCHLORIDE 0.63 MG: 0.63 SOLUTION RESPIRATORY (INHALATION) at 09:13

## 2018-11-19 RX ADMIN — Medication 5 ML: at 21:34

## 2018-11-19 RX ADMIN — ENOXAPARIN SODIUM 40 MG: 40 INJECTION, SOLUTION INTRAVENOUS; SUBCUTANEOUS at 08:48

## 2018-11-19 RX ADMIN — METOPROLOL TARTRATE 12.5 MG: 25 TABLET ORAL at 21:33

## 2018-11-19 RX ADMIN — LEVALBUTEROL HYDROCHLORIDE 0.63 MG: 0.63 SOLUTION RESPIRATORY (INHALATION) at 13:18

## 2018-11-19 NOTE — PROGRESS NOTES
Dysphagia Goals: LTG: Patient will tolerate least restrictive diet without signs/symptoms of aspiration at discharge for safe swallow function. STG: Patient will tolerate mech soft(cardiac)/thin liquids without overt s/sx of penetration/aspiration STG: Patient will participate in oral motor exercises to improve weakness for safe swallow function Speech/Language Goals: LTG: Patient will increase speech production to improve intelligibility across all levels STG: Patient will complete oral motor exercises on 8/10 occasions with min cues. STG: Pt to increase rate of speech while maintaining intelligibility at word, phrase, convo level with minimal cueing STG: Patient will participate in full cognitive evaluation if deemed appropriate Speech language pathology: bedside swallow and speech language note: Initial Assessment and Daily Note 1 NAME/AGE/GENDER: Giovany Grimes is a 61 y.o. 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 Patient tolerating mechanical soft diet/thin liquids without overt s/sx of penetration/aspiration. Patient presented with mechanical soft consistency and thin liquid via cup. Mild oral holding/ delayed swallow with thin liquid. Mildly prolonged mastication due to absent dentition. Oral residue likely due to absent dentition and reduced lingual strength, however cleared adequately with liquid wash. Recommend continue mech soft/thin liquids with safe swallowing recommendations. Will continue to f/u for diet tolerance and solid trials. Speech Language: Patient participated in speech language evaluation. Full results below. Patient with dysarthric speech throughout evaluation. Difficulty with automatic speech tasks. Patient required mod cueing to name months of the year in order. No difficulties with responsive or confrontational naming. Patient's comprehension WFL, able to answer simple and complex yes/no questions, able to follow simple and complex commands, able to give function of object. Will continue to follow patient for dysarthria. Patient will benefit from complete cognitive assessment. ?????? ? ? This section established at most recent assessment?????????? 
PROBLEM LIST (Impairments causing functional limitations): 1. Dysarthria 2. Dysphagia (oral symptoms) REHABILITATION POTENTIAL FOR STATED GOALS: Good PLAN OF CARE:  
Patient will benefit from skilled intervention to address the following impairments. RECOMMENDATIONS AND PLANNED INTERVENTIONS (Benefits and precautions of therapy have been discussed with the patient.): 
· continue prescribed diet MEDICATIONS: 
· With liquid COMPENSATORY STRATEGIES/MODIFICATIONS INCLUDING: 
· Small sips and bites OTHER RECOMMENDATIONS (including follow up treatment recommendations):  
· Oral motor exercises · Family training/education · Patient education RECOMMENDED DIET MODIFICATIONS DISCUSSED WITH: 
· Nursing · Patient FREQUENCY/DURATION: Continue to follow patient 3 times a week for duration of hospital stay to address above goals. RECOMMENDED REHABILITATION/EQUIPMENT: (at time of discharge pending progress): Due to the probability of continued deficits (see above) this patient will likely need continued skilled speech therapy after discharge. SUBJECTIVE:  
Alert/oriented. History of Present Injury/Illness: Ms. Hannah Reynoso  has no past medical history on file. .  She also  has no past surgical history on file. Present Symptoms: dysarthria Pain Intensity 1: 0 Current Medications: No current facility-administered medications on file prior to encounter. No current outpatient medications on file prior to encounter. Current Dietary Status:  [facility] Social History/Home Situation:   
Home Environment: Private residence # Steps to Enter: 5 One/Two Story Residence: One story Living Alone: No 
Support Systems: Friends \ neighbors Patient Expects to be Discharged to[de-identified] Unknown Current DME Used/Available at Home: Cane, straight Tub or Shower Type: Tub/Shower combination OBJECTIVE:  
Respiratory Status:  Room air CXR Results: no acute MRI Results: Small acute left parietal white matter infarct.  No evidence of 
hemorrhage. CT Results:Negative for acute intracranial hemorrhage.  Chronic changes. MRI 
is more sensitive for acute infarct. Oral Motor Structure/Speech:  Oral-Motor Structure/Motor Speech Labial: Decreased rate, Decreased seal, Right droop Dentition: Edentulous Oral Hygiene: fair Lingual: Decreased rate, Decreased strength Cognitive and Communication Status: 
Neurologic State: Alert Orientation Level: Oriented X4 Cognition: Follows commands Perception: Appears intact Perseveration: No perseveration noted Safety/Judgement: Fall prevention BEDSIDE SWALLOW EVALUATIONOral Assessment: 
Oral Assessment Lingual: Decreased rate;Decreased strength P.O. Trials: 
Patient Position: upright in chair The patient was given the following:  
Consistency Presented: Mechanical soft; Thin liquid How Presented: Self-fed/presented;Cup/gulp; Spoon ORAL PHASE: 
Bolus Acceptance: No impairment Bolus Formation/Control: Impaired Propulsion: Delayed (# of seconds) Type of Impairment: Mastication;Delayed Oral Residue: Less than 10% of bolus PHARYNGEAL PHASE: 
Initiation of Swallow: No impairment Laryngeal Elevation: Functional 
Aspiration Signs/Symptoms: None Vocal Quality: Low volume Pharyngeal Phase Characteristics: Double swallow OTHER OBSERVATIONS: 
Rate/bite size: WNL Comments:  
Endurance: WNL Comments: SPEECH-LANGUAGE EVALUATION Tests Given: Aphasia evaluation Motor Speech: 
 Oral-Motor Structure/Motor Speech Lingual: Decreased rate;Decreased strength Auditory Comprehension:  
 1 step commands: 10/10 
2 step commands: 10/10 Object Function: 5/5 Yes/No Questions:  
 Simple: 10/10 Complex: 10/10 Reading Comprehension:  
  
 
Verbal Expression: 
 Verbal Expression Primary Mode of Expression: Verbal 
Initiation: No impairment Automatic Speech Task: Impaired (comment) Automatic speech task cueing type: Verbal 
Automatic speech task cueing amount: Min-mod Repetition: No impairment Naming: No impairment Conversation: Dysarthric Responsive Naming: 10/10 Automatic Speech: 3/4, 4/4 with mod cueing (months of year) Confrontational Naming: 10/10 Written Expression: did not assess Neuro-Linguistics: did not assess Pragmatics: [facility] Voice: 
  
  
  
  
  
Vocal Quality: Low volume Dysphagia Activities: Activities/Procedures listed utilized to improve progress in diet tolerance. Required minimal cueing to improve swallow safety. Tool Used: Dysphagia Outcome and Severity Scale (RD) Score Comments Normal Diet  [] 7 With no strategies or extra time needed Functional Swallow  [] 6 May have mild oral or pharyngeal delay Mild Dysphagia 
  [] 5 Which may require one diet consistency restricted (those who demonstrate penetration which is entirely cleared on MBS would be included) Mild-Moderate Dysphagia  [] 4 With 1-2 diet consistencies restricted Moderate Dysphagia  [] 3 With 2 or more diet consistencies restricted Moderately Severe Dysphagia  [] 2 With partial PO strategies (trials with ST only) Severe Dysphagia  [] 1 With inability to tolerate any PO safely Score:  Initial: 5 Most Recent: X (Date: -- ) Interpretation of Tool: The Dysphagia Outcome and Severity Scale (RD) is a simple, easy-to-use, 7-point scale developed to systematically rate the functional severity of dysphagia based on objective assessment and make recommendations for diet level, independence level, and type of nutrition. Score 7 6 5 4 3 2 1 Modifier CH CI CJ CK CL CM CN ? Swallowing:  
  - CURRENT STATUS: CJ - 20%-39% impaired, limited or restricted  - GOAL STATUS:  CH - 0% impaired, limited or restricted  - D/C STATUS:  ---------------To be determined--------------- Payor: Janelle / Plan: [address] [place] [address] Avenue / Product Type: Managed Care Medicaid /  
 
________________________________________________________________________________________________ Safety: After treatment position/precautions: · Up in chair Treatment Assessment:  Patient participated in dysphagia therapy and speech/lang eval without difficulty. Progression/Medical Necessity:  
· Skilled intervention continues to be required due to patient still consuming a modified diet and dysarthria. · Skilled intervention continues to be required due to dysarthria. Compliance with Program/Exercises: Will assess as treatment progresses Reason for Continuation of Services/Other Comments: 
· Patient continues to require skilled intervention due to oral dysphagia. · Patient continues to require skilled intervention due to dysarthria. Recommendations/Intent for next treatment session: \"Treatment next visit will focus on oral motor exercises, dysarthria exercises, solid trials\". Total Treatment Duration: 
Time In: 9878 Time Out: 3[address] Est[address]burn, [facility], CF-SLP

## 2018-11-19 NOTE — PROGRESS NOTES
Ischemic Stroke without Activase/TIA 
 
VTE Prophylaxis: Yes: Lovenox Antiplatelet: No 
 
Statin if LDL Greater Than or Equal to100: Yes: Lipitor BP Parameters: Less Than 220/120 for 24 hours, then consult MD for parameters Controlled With: PRN - IV Dysphagia Screen Completed: Yes: Pass Dysphagia Screening Vocal Quality/Secretions: (!) Abnormal 
History of Dysphagia: No 
O2 Saturation: Normal 
Alertness: Normal 
Pre-Swallow Assessment Score: 1 Purees: No difficulty noted Water by Cup: No difficulty noted Water by Straw: No difficulty noted Patient has PEG, NG Tube, Feeding Tube: No 
 
Medication orders per above route: Yes 
 
Nutrition Status: PO 
 
NIH Stroke Scale Complete: Yes: 5 Frequency of Vital Signs: Every 4 hours Frequency of Neuro Checks: Every 4 hours Daily Education/Care Plan Updated: Yes Kenyatta Coppola

## 2018-11-19 NOTE — PROGRESS NOTES
Patient has been declined by both  [facility] for STR. CM will meet with patient / family to obtain alternate SNF choices for STR.

## 2018-11-19 NOTE — PROGRESS NOTES
Per Deb Tom in admissions at [facility], patient does not have any STR benefits through her insurance, and will not be able to admit to any SNF for rehab after discharge. CM will meet with patient and family to reevaluate for safe discharge plan.

## 2018-11-19 NOTE — PROGRESS NOTES
Attempted OT treatment. Patient is currently working with speech therapist. Will check back as schedule permits.   
Luis Dixon, OTR/L

## 2018-11-19 NOTE — PROGRESS NOTES
Ischemic Stroke without Activase/TIA 
 
VTE Prophylaxis: Yes: Lovenox Antiplatelet: No 
 
Statin if LDL Greater Than or Equal to100: Yes: Lipitor BP Parameters: Less Than 220/120 for 24 hours, then consult MD for parameters Controlled With: PRN - IV Dysphagia Screen Completed: Yes: Pass Dysphagia Screening Vocal Quality/Secretions: (!) Abnormal 
History of Dysphagia: No 
O2 Saturation: Normal 
Alertness: Normal 
Pre-Swallow Assessment Score: 1 Purees: No difficulty noted Water by Cup: No difficulty noted Water by Straw: No difficulty noted Patient has PEG, NG Tube, Feeding Tube: No 
 
Medication orders per above route: Yes 
 
Nutrition Status: PO 
 
NIH Stroke Scale Complete: Yes: 4 Frequency of Vital Signs: Every 4 hours Frequency of Neuro Checks: Every 4 hours Daily Education/Care Plan Updated: Yes Ileana Bagley

## 2018-11-19 NOTE — INTERDISCIPLINARY ROUNDS
Interdisciplinary team rounds were held 11/19/2018 with the following team members:Care Management, Nursing, Nurse Practitioner and Occupational Therapy and the patient. Plan of care discussed. See clinical pathway and/or care plan for interventions and desired outcomes.

## 2018-11-19 NOTE — PROGRESS NOTES
Pt withdrawn and anxious in pm. Medicated with Ativan. Continues to refuse to answer questions or follow commands. Hourly rounds performed through shift, pt denies needs at this time. Bed in low position and call light/ personal items within reach. Will continue to monitor and report to oncoming nurse.

## 2018-11-20 PROCEDURE — 74011000250 HC RX REV CODE- 250: Performed by: INTERNAL MEDICINE

## 2018-11-20 PROCEDURE — 94760 N-INVAS EAR/PLS OXIMETRY 1: CPT

## 2018-11-20 PROCEDURE — 74011250637 HC RX REV CODE- 250/637: Performed by: NURSE PRACTITIONER

## 2018-11-20 PROCEDURE — 74011250636 HC RX REV CODE- 250/636: Performed by: INTERNAL MEDICINE

## 2018-11-20 PROCEDURE — 99232 SBSQ HOSP IP/OBS MODERATE 35: CPT | Performed by: PHYSICAL MEDICINE & REHABILITATION

## 2018-11-20 PROCEDURE — 65270000029 HC RM PRIVATE

## 2018-11-20 PROCEDURE — 94640 AIRWAY INHALATION TREATMENT: CPT

## 2018-11-20 PROCEDURE — 92507 TX SP LANG VOICE COMM INDIV: CPT

## 2018-11-20 PROCEDURE — 97530 THERAPEUTIC ACTIVITIES: CPT

## 2018-11-20 PROCEDURE — 74011250637 HC RX REV CODE- 250/637: Performed by: INTERNAL MEDICINE

## 2018-11-20 RX ORDER — AMLODIPINE BESYLATE 10 MG/1
10 TABLET ORAL DAILY
Qty: 30 TAB | Refills: 0 | Status: SHIPPED | OUTPATIENT
Start: 2018-11-21 | End: 2019-09-12

## 2018-11-20 RX ORDER — ATORVASTATIN CALCIUM 40 MG/1
40 TABLET, FILM COATED ORAL
Qty: 30 TAB | Refills: 0 | Status: SHIPPED | OUTPATIENT
Start: 2018-11-20

## 2018-11-20 RX ORDER — IBUPROFEN 200 MG
1 TABLET ORAL DAILY
Qty: 30 PATCH | Refills: 0 | Status: SHIPPED | OUTPATIENT
Start: 2018-11-21 | End: 2018-12-21

## 2018-11-20 RX ORDER — METOPROLOL TARTRATE 25 MG/1
25 TABLET, FILM COATED ORAL EVERY 12 HOURS
Qty: 60 TAB | Refills: 0 | Status: SHIPPED | OUTPATIENT
Start: 2018-11-20 | End: 2019-09-12

## 2018-11-20 RX ORDER — GUAIFENESIN 100 MG/5ML
81 LIQUID (ML) ORAL DAILY
Qty: 30 TAB | Refills: 0 | Status: SHIPPED | OUTPATIENT
Start: 2018-11-21

## 2018-11-20 RX ADMIN — Medication 5 ML: at 21:51

## 2018-11-20 RX ADMIN — GUAIFENESIN 1200 MG: 600 TABLET, EXTENDED RELEASE ORAL at 09:27

## 2018-11-20 RX ADMIN — Medication 5 ML: at 06:02

## 2018-11-20 RX ADMIN — LEVALBUTEROL HYDROCHLORIDE 0.63 MG: 0.63 SOLUTION RESPIRATORY (INHALATION) at 19:17

## 2018-11-20 RX ADMIN — ASPIRIN 81 MG 81 MG: 81 TABLET ORAL at 09:27

## 2018-11-20 RX ADMIN — LEVALBUTEROL HYDROCHLORIDE 0.63 MG: 0.63 SOLUTION RESPIRATORY (INHALATION) at 13:56

## 2018-11-20 RX ADMIN — METOPROLOL TARTRATE 12.5 MG: 25 TABLET ORAL at 21:49

## 2018-11-20 RX ADMIN — GUAIFENESIN 1200 MG: 600 TABLET, EXTENDED RELEASE ORAL at 21:49

## 2018-11-20 RX ADMIN — Medication 5 ML: at 14:00

## 2018-11-20 RX ADMIN — METOPROLOL TARTRATE 12.5 MG: 25 TABLET ORAL at 09:28

## 2018-11-20 RX ADMIN — LEVALBUTEROL HYDROCHLORIDE 0.63 MG: 0.63 SOLUTION RESPIRATORY (INHALATION) at 07:24

## 2018-11-20 RX ADMIN — ATORVASTATIN CALCIUM 40 MG: 40 TABLET, FILM COATED ORAL at 21:49

## 2018-11-20 RX ADMIN — AMLODIPINE BESYLATE 10 MG: 10 TABLET ORAL at 09:28

## 2018-11-20 RX ADMIN — ENOXAPARIN SODIUM 40 MG: 40 INJECTION, SOLUTION INTRAVENOUS; SUBCUTANEOUS at 09:29

## 2018-11-20 NOTE — PROGRESS NOTES
E REHAB PROGRESS NOTE Derrick Challenger Admit Date: 11/15/2018 Admit Diagnosis: CVA (cerebral vascular accident) ([address] Subjective Patient is very motivated, and tolerates therapy generally well. However she remains plegic on her right side still, with no notable motor improvement. Patient requires moderate assist for bed mobility, and SPT. Unable to attempt gait. Patient requires a viable discharge plan. IRC continuing to follow. Objective:  
 
Current Facility-Administered Medications Medication Dose Route Frequency  guaiFENesin ER (MUCINEX) tablet 1,200 mg  1,200 mg Oral Q12H  
 amLODIPine (NORVASC) tablet 10 mg  10 mg Oral DAILY  metoprolol tartrate (LOPRESSOR) tablet 12.5 mg  12.5 mg Oral Q12H  
 benzonatate (TESSALON) capsule 100 mg  100 mg Oral TID PRN  
 levalbuterol (XOPENEX) nebulizer soln 0.63 mg/3 mL  0.63 mg Nebulization Q6HWA RT  
 LORazepam (ATIVAN) tablet 0.5 mg  0.5 mg Oral Q6H PRN  
 cyclobenzaprine (FLEXERIL) tablet 10 mg  10 mg Oral TID PRN  
 influenza vaccine 2018-19 (6 mos+)(PF) (FLUARIX QUAD/FLULAVAL QUAD) injection 0.5 mL  0.5 mL IntraMUSCular PRIOR TO DISCHARGE  sodium chloride (NS) flush 5-10 mL  5-10 mL IntraVENous Q8H  
 sodium chloride (NS) flush 5-10 mL  5-10 mL IntraVENous PRN  
 ondansetron (ZOFRAN) injection 4 mg  4 mg IntraVENous Q6H PRN  
 aspirin chewable tablet 81 mg  81 mg Oral DAILY  atorvastatin (LIPITOR) tablet 40 mg  40 mg Oral QHS  acetaminophen (TYLENOL) tablet 650 mg  650 mg Oral Q6H PRN  
 enoxaparin (LOVENOX) injection 40 mg  40 mg SubCUTAneous Q24H  
 nicotine (NICODERM CQ) 21 mg/24 hr patch 1 Patch  1 Patch TransDERmal DAILY  hydrALAZINE (APRESOLINE) 20 mg/mL injection 10 mg  10 mg IntraVENous Q6H PRN Visit Vitals BP (!) 156/104 (BP 1 Location: Left arm, BP Patient Position: Sitting) Pulse 95 Temp 98.6 °F (37 °C) Resp 16 SpO2 95% Breastfeeding?  No  
  
 
 Review of Systems: +antalgic gait. Denies chest pain, shortness of breath, cough, headache, visual problems, abdominal pain, dysurea, calf pain. Pertinent positives are as noted in the medical records and unremarkable otherwise. Physical Exam:  
General: Alert and age appropriately oriented. No acute cardio respiratory distress. HEENT: Normocephalic, no scleral icterus, no JVD Lungs: Clear to auscultation Heart: Regular rate and rhythm, S1, S2 No  murmurs, no JVD. Abdomen: Soft, non-tender, non-distended. Genitourinary: defered Neuromuscular: PERRL, EOMI Mild R facial droop Speech mildly dysarthric. LUE     Shoulder abduction   5-/5 Elbow flexion:  5- /5 Wrist extension:  5/5 Finger flexion;   5/5 
RUE    Shoulder abduction:0 /5 Elbow flexion:   0/5 Wrist extension: 0 /5 Finger flexion:  0 /5 
            ADMQ:   0/5 LLE     Hip flexion:   4+/5 Knee extension:  5- /5 Ankle dorsiflexion:  5 /5 Ankle plantarflexion:   5/5 RLE     Hip flexion:  0 /5 Knee extension: 0  /5 Ankle dorsiflexion:  0 /5 Ankle plantarflexion: 0  /5 Sensory - intact grossly  
 no tremors. Skin/extremity: No calf tenderness BLE  No pedal edema. Functional Assessment: 
Gross Assessment AROM: Grossly decreased, non-functional(R LE) (11/16/18 1418) PROM: Within functional limits(BUE) (11/16/18 1017) Strength: Grossly decreased, non-functional(R LE) (11/16/18 1418) Coordination: Grossly decreased, non-functional(RUE ) (11/16/18 1017) Tone: Abnormal (11/16/18 1418) Sensation: Intact (11/16/18 1418) Bed Mobility Rolling: Minimum assistance (11/20/18 1200) Supine to Sit: Moderate assistance (11/20/18 1200) Sit to Supine: Maximum assistance;Assist x2 (11/16/18 1418) Scooting: Moderate assistance;Maximum assistance (11/20/18 1200) Balance Sitting: Impaired (11/20/18 1457) Sitting - Static: Good (unsupported) (11/20/18 1200) Sitting - Dynamic: Fair (occasional) (11/20/18 1200) Standing: Impaired (11/20/18 1200) Standing - Static: Poor (11/20/18 1200) Standing - Dynamic : Poor (11/20/18 1200) Grooming Grooming Assistance: Total assistance(dependent)(using RUE only ) (11/20/18 1457) Washing Face: Total assistance (dependent); Training to use affected extremity as a gross motor assistance (11/20/18 1457) Brushing/Combing Hair: Total assistance (dependent); Training to use affected extremity as a gross motor assistance (11/20/18 1457) Toileting Toileting Assistance: Total assistance(dependent) (11/16/18 1017) Bed/Mat Mobility Rolling: Minimum assistance (11/20/18 1200) Supine to Sit: Moderate assistance (11/20/18 1200) Sit to Supine: Maximum assistance;Assist x2 (11/16/18 1418) Sit to Stand: Moderate assistance (11/20/18 1200) Scooting: Moderate assistance;Maximum assistance (11/20/18 1200) Dannial Shadow Fall Risk Assessment: 
[facility] Risk Mobility: Ambulates or transfers with assist devices or assistance (11/20/18 0025) Mobility Interventions: Bed/chair exit alarm;Communicate number of staff needed for ambulation/transfer;Patient to call before getting OOB (11/20/18 0025) Mentation: Periodic confusion (11/20/18 0025) Mentation Interventions: Door open when patient unattended;Reorient patient; Toileting rounds (11/20/18 0025) Medication: Patient receiving anticonvulsants, sedatives(tranquilizers), psychotropics or hypnotics, hypoglycemics, narcotics, sleep aids, antihypertensives, laxatives, or diuretics (11/20/18 0025) Medication Interventions: Patient to call before getting OOB; Bed/chair exit alarm (11/20/18 0025) Elimination: Needs assistance with toileting (11/20/18 0025) Elimination Interventions: Call light in reach (11/20/18 0025) Prior Fall History: No (11/20/18 0025) History of Falls Interventions: Bed/chair exit alarm (11/20/18 0025) Total Score: 4 (11/20/18 0025) Standard Fall Precautions: Yes (11/18/18 0800) High Fall Risk: Yes (11/20/18 0025) Speech Assessment: 
Aspiration Signs/Symptoms: None (11/19/18 1537) Ambulation: 
   
 
Labs/Studies: 
Recent Results (from the past 72 hour(s)) CBC WITH AUTOMATED DIFF Collection Time: 11/18/18  5:27 AM  
Result Value Ref Range WBC 4.7 4.3 - 11.1 K/uL  
 RBC 3.98 (L) 4.05 - 5.2 M/uL  
 HGB 12.1 11.7 - 15.4 g/dL HCT 36.2 35.8 - 46.3 % MCV 91.0 79.6 - 97.8 FL  
 MCH 30.4 26.1 - 32.9 PG  
 MCHC 33.4 31.4 - 35.0 g/dL  
 RDW 13.7 % PLATELET 908 367 - 876 K/uL MPV 9.1 (L) 9.4 - 12.3 FL ABSOLUTE NRBC 0.00 0.0 - 0.2 K/uL  
 DF AUTOMATED NEUTROPHILS 58 43 - 78 % LYMPHOCYTES 23 13 - 44 % MONOCYTES 13 (H) 4.0 - 12.0 % EOSINOPHILS 5 0.5 - 7.8 % BASOPHILS 1 0.0 - 2.0 % IMMATURE GRANULOCYTES 0 0.0 - 5.0 %  
 ABS. NEUTROPHILS 2.7 1.7 - 8.2 K/UL  
 ABS. LYMPHOCYTES 1.1 0.5 - 4.6 K/UL  
 ABS. MONOCYTES 0.6 0.1 - 1.3 K/UL  
 ABS. EOSINOPHILS 0.2 0.0 - 0.8 K/UL  
 ABS. BASOPHILS 0.0 0.0 - 0.2 K/UL  
 ABS. IMM. GRANS. 0.0 0.0 - 0.5 K/UL METABOLIC PANEL, BASIC Collection Time: 11/18/18  5:27 AM  
Result Value Ref Range Sodium 135 (L) 136 - 145 mmol/L Potassium 3.4 (L) 3.5 - 5.1 mmol/L Chloride 104 98 - 107 mmol/L  
 CO2 24 21 - 32 mmol/L Anion gap 7 7 - 16 mmol/L Glucose 102 (H) 65 - 100 mg/dL BUN 8 8 - 23 MG/DL Creatinine 0.65 0.6 - 1.0 MG/DL  
 GFR est AA >60 >60 ml/min/1.73m2 GFR est non-AA >60 >60 ml/min/1.73m2 Calcium 8.1 (L) 8.3 - 10.4 MG/DL MAGNESIUM Collection Time: 11/18/18  5:27 AM  
Result Value Ref Range Magnesium 2.0 1.8 - 2.4 mg/dL GLUCOSE, POC Collection Time: 11/18/18  8:20 PM  
Result Value Ref Range Glucose (POC) 167 (H) 65 - 100 mg/dL Assessment:  
 
Problem List as of 11/20/2018 Never Reviewed Codes Class Noted - Resolved * (Principal) CVA (cerebral vascular accident) ([facility] [address] ICD-10-CM: I63.9 ICD-9-CM: 434.91  11/15/2018 - Present Hypertension (Chronic) ICD-10-CM: I10 
ICD-9-CM: 401.9  11/15/2018 - Present Tobacco use (Chronic) ICD-10-CM: Z72.0 ICD-9-CM: 305.1  11/15/2018 - Present Hyponatremia ICD-10-CM: E87.1 ICD-9-CM: 276.1  11/15/2018 - Present Leukopenia ICD-10-CM: D72.819 ICD-9-CM: 288.50  11/15/2018 - Present Plan:  
acute left parietal CVA - resulting in right hemiparesis 
- medical management for CVA + secondary prevention Continue  acute PT, OT and ST as tolerated. Patient is making functional progress despite no improvement in right sided weakness. - recommend a manual (light weight) WC 
- recommend to start on a ramp for home - patient states it is her friend's home. - will continue to follow progress. Signed By: Dickson Turner MD   
 November 20, 2018

## 2018-11-20 NOTE — PROGRESS NOTES
Problem: Mobility Impaired (Adult and Pediatric) Goal: *Acute Goals and Plan of Care (Insert Text) STG: 
(1.)Ms. Skip Larson will move from supine to sit and sit to supine , scoot up and down and roll side to side with MODERATE ASSIST within 3 treatment day(s). (2.)Ms. Skip Larson will transfer from bed to chair and chair to bed with MAXIMAL ASSIST using the least restrictive device within 3 treatment day(s). (3.)Ms. Skip Larson will ambulate with MAXIMAL ASSIST for 10 feet with the least restrictive device within 3 treatment day(s). (4.)Ms. Skip Larson will maintain static/dynamic sitting x 12 minutes with at least FAIR balance for improved safety within 3 days. LTG: 
(1.)Ms. Skip Larson will move from supine to sit and sit to supine , scoot up and down and roll side to side in bed with MINIMAL ASSIST within 7 treatment day(s). (2.)Ms. Skip Larson will transfer from bed to chair and chair to bed with MINIMAL ASSIST using the least restrictive device within 7 treatment day(s). (3.)Ms. Skip Larson will ambulate with MINIMAL ASSIST for 50+ feet with the least restrictive device within 7 treatment day(s). (4.)Ms. Skip Larson will maintain static/dynamic sitting x 12 minutes with at least FAIR + balance for improved safety within 7 days. ________________________________________________________________________________________________ PHYSICAL THERAPY: Daily Note, Treatment Day: 2nd, AM 11/20/2018INPATIENT: Hospital Day: 6 Payor: Janelle / Plan: [id number] / Product Type: Managed Care Medicaid /  
  
NAME/AGE/GENDER: Bibi Delgado is a 61 y.o. female PRIMARY DIAGNOSIS: CVA (cerebral vascular accident) [facility]A (cerebral vascular accident) ([facility] ICD-10: Treatment Diagnosis:  
 · Generalized Muscle Weakness (M62.81) · Difficulty in walking, Not elsewhere classified (R26.2) · History of falling (Z91.81) Precaution/Allergies: Patient has no known allergies. ASSESSMENT:  
Ms. Mahin Vallejo is a 61 y.o. female in the hospital for the above who was supine in bed upon arrival. Very agreeable to therapy. Pt reports that she lives in a one story house with a friend that has 5 steps to enter. Pt also reported that PTA she was independent with ADLs and ambulated with independence. Pt admitted to one recent falls in the past year. Bed mobility requires min to moderate assist however patient is trying to assist.  Sitting balance edge of bed fair to good. Sit to stand with moderate assist.  Stand pivot transfer to the [facility] then to the recliner. Patient has decreased standing tolerance and decreased upright posture. Patient required assistance to move back in the recliner. Patient instructed in therapeutic exercises for bilateral LE strengthening and ROM. Tolerated well. Making progress as patient is very motivated and cooperative. Patient is left in the recliner with alarm intact and needs within reach. Ms. Mahin Vallejo could benefit from skilled PT as she is currently functioning below her baseline. Additional skilled therapy would be very beneficial at discharge. Will continue PT efforts. . 
 
 
This section established at most recent assessment PROBLEM LIST (Impairments causing functional limitations): 1. Decreased Strength 2. Decreased ADL/Functional Activities 3. Decreased Transfer Abilities 4. Decreased Ambulation Ability/Technique 5. Decreased Balance INTERVENTIONS PLANNED: (Benefits and precautions of physical therapy have been discussed with the patient.) 1. Balance Exercise 2. Bed Mobility 3. Family Education 4. Gait Training 5. Neuromuscular Re-education/Strengthening 6. Therapeutic Activites 7. Therapeutic Exercise/Strengthening 8. Transfer Training TREATMENT PLAN: Frequency/Duration: 3 times a week for duration of hospital stay Rehabilitation Potential For Stated Goals: Good RECOMMENDED REHABILITATION/EQUIPMENT: (at time of discharge pending progress): Due to the probability of continued deficits (see above) this patient will likely need continued skilled physical therapy after discharge. Equipment:  
? None at this time HISTORY:  
History of Present Injury/Illness (Reason for Referral): 
See H&P Past Medical History/Comorbidities: Ms. Mary Lacy  has no past medical history on file. Ms. Mary Lacy  has no past surgical history on file. Social History/Living Environment:  
Home Environment: Private residence # Steps to Enter: 5 One/Two Story Residence: One story Living Alone: No 
Support Systems: Friends \ neighbors Patient Expects to be Discharged to[de-identified] Unknown Current DME Used/Available at Home: Cane, straight Tub or Shower Type: Tub/Shower combination Prior Level of Function/Work/Activity: 
Lives with friend in one story house and PTA pt was independent with ADLs and ambulation. One recent fall reported. Number of Personal Factors/Comorbidities that affect the Plan of Care: 0: LOW COMPLEXITY EXAMINATION:  
Most Recent Physical Functioning:  
Gross Assessment: 
  
         
  
Posture: 
  
Balance: 
Sitting: Impaired Sitting - Static: Good (unsupported) Sitting - Dynamic: Fair (occasional) Standing: Impaired Standing - Static: Poor Standing - Dynamic : Poor Bed Mobility: 
Rolling: Minimum assistance Supine to Sit: Moderate assistance Scooting: Moderate assistance;Maximum assistance Wheelchair Mobility: 
  
Transfers: 
Sit to Stand: Moderate assistance Stand to Sit: Moderate assistance Gait: 
  
   
  
Body Structures Involved: 1. Nerves 2. Muscles Body Functions Affected: 1. Neuromusculoskeletal 
2. Movement Related Activities and Participation Affected: 1. General Tasks and Demands 2. Mobility 3. Self Care 4. Domestic Life 5. Community, Social and [place] Number of elements that affect the Plan of Care: 4+: HIGH COMPLEXITY CLINICAL PRESENTATION:  
Presentation: Stable and uncomplicated: LOW COMPLEXITY CLINICAL DECISION MAKING:  
[facility] AM-PAC 6 Clicks Basic Mobility Inpatient Short Form How much difficulty does the patient currently have. .. Unable A Lot A Little None 1. Turning over in bed (including adjusting bedclothes, sheets and blankets)? [] 1   [] 2   [x] 3   [] 4  
2. Sitting down on and standing up from a chair with arms ( e.g., wheelchair, bedside commode, etc.)   [] 1   [x] 2   [] 3   [] 4  
3. Moving from lying on back to sitting on the side of the bed? [] 1   [x] 2   [] 3   [] 4 How much help from another person does the patient currently need. .. Total A Lot A Little None 4. Moving to and from a bed to a chair (including a wheelchair)? [] 1   [x] 2   [] 3   [] 4  
5. Need to walk in hospital room? [x] 1   [] 2   [] 3   [] 4  
6. Climbing 3-5 steps with a railing? [x] 1   [] 2   [] 3   [] 4  
© 2007, Trustees of [facility], under license to [employer]. All rights reserved Score:  Initial: 11 Most Recent: X (Date: -- ) Interpretation of Tool:  Represents activities that are increasingly more difficult (i.e. Bed mobility, Transfers, Gait). Score 24 23 22-20 19-15 14-10 9-7 6 Modifier CH CI CJ CK CL CM CN   
 
? Mobility - Walking and Moving Around:  
  - CURRENT STATUS: CL - 60%-79% impaired, limited or restricted  - GOAL STATUS: CK - 40%-59% impaired, limited or restricted  - D/C STATUS:  ---------------To be determined--------------- Payor: Janelle / Plan: [address] [place] [address] Avenue / Product Type: Managed Care Medicaid /   
 
Medical Necessity:    
· Patient demonstrates good rehab potential due to higher previous functional level. Reason for Services/Other Comments: 
· Patient continues to require skilled intervention due to decreased functional mobility and balance. Use of outcome tool(s) and clinical judgement create a POC that gives a: Clear prediction of patient's progress: LOW COMPLEXITY  
  
 
 
 
TREATMENT:  
(In addition to Assessment/Re-Assessment sessions the following treatments were rendered) Pre-treatment Symptoms/Complaints: \"OK I am ready\" Pain: Initial:  
Pain Intensity 1: 0  Post Session:  0 Therapeutic Activity: (    24  minutes): Therapeutic activities including rolling each direction, scooting up in bed, and education on handling of R paretic arm to improve mobility, strength and coordination. Required minimal cues   to promote coordination of bilateral, upper extremity(s), lower extremity(s). Therapeutic Exercise: (  minutes):  Exercises per grid below to improve mobility, strength, balance and coordination. Required moderate  verbal and tactile cues to promote proper body posture. Progressed repetitions and complexity of movement as indicated. Date: 
11/19/18 Date: 
 Date: Activity/Exercise Parameters Parameters Parameters Ankle pumps 30 LAQ 30    
marching 30    
abduction 30 Braces/Orthotics/Lines/Etc:  
· O2 Device: Room air Treatment/Session Assessment:   
· Response to Treatment:  Tolerated well · Interdisciplinary Collaboration:  
o Physical Therapy Assistant 
o Registered Nurse · After treatment position/precautions:  
o Up in chair 
o Bed alarm/tab alert on 
o Bed/Chair-wheels locked 
o Call light within reach 
o RN notified · Compliance with Program/Exercises: Will assess as treatment progresses · Recommendations/Intent for next treatment session: \"Next visit will focus on advancements to more challenging activities and reduction in assistance provided\". Total Treatment Duration: PT Patient Time In/Time Out Time In: 1118 Time Out: 3889 Kathi French PTA

## 2018-11-20 NOTE — PROGRESS NOTES
Problem: Interdisciplinary Rounds Goal: Interdisciplinary Rounds Outcome: Progressing Towards Goal 
Interdisciplinary team rounds were held 11/20/2018 with the following team members:Care Management, Nurse Practitioner, Physical Therapy and  and the patient. Anticipate discharge home with home health tomorrow. Plan of care discussed. See clinical pathway and/or care plan for interventions and desired outcomes.

## 2018-11-20 NOTE — PROGRESS NOTES
CM followed up with patient's niece, Audrey Anthony, regarding patient's discharge needs as she is not able to discharge to SNF as planned. Audrey Anthony reported that she and other extended family members are at patient's home daily to care for her, and will be able to provide round-the-clock care. CM encouraged the use of [facility] services for PT/OT and possible RN if needed. Audrey Anthony declined these services stating family will care for her after discharge. Audrey Anthony requested DME to assist in patient's care. Order for RW and 3-in-1 BSC faxed to [facility] Requested delivery to room today. PLAN FOR DISCHARGE: 
Home with family assistance. HH declined. Pending DME delivery and medical readiness.

## 2018-11-20 NOTE — DISCHARGE SUMMARY
Hospitalist Discharge Summary     Admit Date:  11/15/2018 11:00 AM   Name:  Philemon Primrose   Age:  61 y.o.  :  1955   MRN:  [id number]   PCP:  None  Treatment Team: Attending Provider: Emelina Munguia MD; Consulting Provider: Mayur Sinha MD; Utilization Review: Brigitte Thompson; Consulting Provider: Nahomy Alva MD; Care Manager: Tejal Vergara Charge Nurse: Mary Jane Epstein Speech Language Pathologist: Blue Powers    Problem List for this Hospitalization:  Hospital Problems as of 2018 Never Reviewed          Codes Class Noted - Resolved POA    * (Principal) CVA (cerebral vascular accident) [facility]) ICD-10-CM: I63.9  ICD-9-CM: 434.91  11/15/2018 - Present Unknown        Hypertension (Chronic) ICD-10-CM: I10  ICD-9-CM: 401.9  11/15/2018 - Present Yes        Tobacco use (Chronic) ICD-10-CM: Z72.0  ICD-9-CM: 305.1  11/15/2018 - Present Yes        Hyponatremia ICD-10-CM: E87.1  ICD-9-CM: 276.1  11/15/2018 - Present Yes        Leukopenia ICD-10-CM: D72.819  ICD-9-CM: 288.50  11/15/2018 - Present Yes                Admission HPI from 11/15/2018:      Ms. Ladan Gonzales is a 60 yo female with PMH of HTN, tobacco use who is evaluated with complaints of right sided weakness. She feels dizzy with ambulation and has had several falls. Stopped antihypertensives per PCP. Admits to ongoing smoking. Denies speech changes. CT head negative but MRI brain shows left parietal CVA. Her BP meds had been stopped prior to hospitalization so she has been started on Norvasc and Lopressor for goal pressures < 140/90. Ideally pt needs to d/c to [facility] but her insurance does not cover this. The plan is for pt to d/c home with her niece.  She requires 24/7 care and I feel this will be a difficult situation but home health has been set up to assist.  She is stable for d/c home but her niece states she is not ready until tomorrow.       Hospital Course:    Pt is a 60 yo female with pmh HTN, smoker who presented to ER with right sided weakness, found to hae acute left parietal infarct. She was started on ASA and stain. Follow up instructions and discharge meds at bottom of this note. Plan was discussed with pt, pt's niece, case management. All questions answered. Patient was stable at time of discharge. Diagnostic Imaging/Tests:   Xr Chest Sngl V    Result Date: 11/15/2018  Chest X-ray INDICATION:   Chest congestion A portable AP view of the chest was obtained. FINDINGS: The lungs are clear. There are no infiltrates or effusions. The heart size is normal.  The aorta is slightly prominent and atherosclerotic. IMPRESSION: No acute findings in the chest     Mri Brain Wo Cont    Result Date: 11/15/2018  MRI of the brain INDICATION:  Right arm and hand weakness Standard MRI sequences were obtained through the brain in multiple planes without IV contrast FINDINGS: There is a small acute lacunar infarcts in the left corona radiata. There is no associated hemorrhage. No other areas of acute infarction are seen. There is an old lacunar infarct in the right midbrain. There is moderate chronic change in the cerebellum and periventricular white matter. .  The ventricles are normal in size. There are no extra-axial fluid collections. There are no intracranial masses. There are no bony lesions. The sinuses are clear. IMPRESSION: Small acute left parietal white matter infarct. No evidence of hemorrhage. Ct Head Wo Cont    Result Date: 11/15/2018  CT HEAD WITHOUT CONTRAST. INDICATION: Right-sided weakness. COMPARISON: None. TECHNIQUE:   5 mm axial scans from the skull base to the vertex. Our CT scanners use one or more of the following:  Automated exposure control, adjustment of the mA and or kV according to patient size, iterative reconstruction. FINDINGS:  No acute intraparenchymal hemorrhage or abnormal extra-axial fluid collection. The ventricles are normal size.  Fairly extensive white matter low attenuation is present, especially frontal lobe nonspecific, likely chronic small vessel disease. Old small pontine infarct on the right. No midline shift mass effect. Included portion of the paranasal sinuses and orbits grossly unremarkable. IMPRESSION:  Negative for acute intracranial hemorrhage. Chronic changes. MRI is more sensitive for acute infarct. Duplex Carotid Bilateral    Result Date: 11/15/2018  INDICATION:  Acute left parietal infarct. TECHNIQUE: Grayscale, color, and spectral duplex Doppler interrogation performed. NASCET criteria was utilized. FINDINGS: -                 CCA cm/sec         ICA cm/sec                      -                 PSV      EDV         PSV      EDV       ICA/CCA ratio Right           115       32            124        49               1.1 Left             104       19            114        21               1.1 There is significant calcified plaque in the right carotid bulb, but no significant stenosis based on flow velocities. There is normal antegrade flow in the right vertebral artery. There is also significant calcified plaque in the left carotid bulb and proximal left internal carotid artery. Again, there is no significant increase in flow velocity. There is normal antegrade flow in the left vertebral artery. IMPRESSION: Extensive atherosclerosis in the carotid arteries bilaterally. No ultrasound evidence of significant carotid stenosis. .       Echocardiogram results:  Results for orders placed or performed during the hospital encounter of 11/15/18   2D ECHO COMPLETE ADULT (TTE) W OR WO CONTR    Narrative    [facility] [address]  [phone]    Transthoracic Echocardiogram  2D, M-mode, Doppler, and Color Doppler    Patient: [facility] Judy STEINBERG  MR #: [id number]  : 1955  Age: 61 years  Gender: Female  Study date: 2018  Account #: [de-identified]  Height: 66.9 in  Weight: 162.6 lb  BSA: 1.85 mï¾²  Status:Routine  Location: 706  BP: 127/ 74    Allergies: NO KNOWN ALLERGENS    Sonographer:  Rona Garcia RDCS  Group:  [facility]  Referring Physician:  Abelardo Knight. Doron Clancy MD  Reading Physician:  Cristhian Ortiz MD [facility]    INDICATIONS: CVA. *Technically difficult study due to poor patient compliance. Patient moving  throughout study, and would not stay on left side. Coughing throughout study. Limited windows due to lung interference. Some images obtained from   subcostall  window. *    PROCEDURE: This was a routine study. A transthoracic echocardiogram was  performed. The study included complete 2D imaging, M-mode, complete spectral  Doppler, and color Doppler. Intravenous contrast (Definity) was administered. Intravenous contrast (agitated saline) was administered. Echocardiographic  views were limited by restricted patient mobility, poor patient compliance,   and  poor acoustic window availability. This was a technically difficult study. LEFT VENTRICLE: Size was normal. Systolic function was normal. Ejection  fraction was estimated in the range of 60 % to 65 %. There were no regional  wall motion abnormalities. Wall thickness was normal. The E/e' ratio was 6.7. Left ventricular diastolic function parameters were normal.    RIGHT VENTRICLE: The size was normal. The tricuspid jet envelope definition   was  inadequate for estimation of RV systolic pressure. LEFT ATRIUM: Size was normal.    ATRIAL SEPTUM: Agitated saline contrast injection (bubble study) was   performed. There was no right-to-left shunt, with provocative maneuvers to increase   right  atrial pressure. RIGHT ATRIUM: Size was normal.    SYSTEMIC VEINS: IVC: The inferior vena cava was normal in size and course. AORTIC VALVE: The valve was probably trileaflet. There was no evidence for  stenosis. There was no insufficiency.     MITRAL VALVE: Valve structure was normal. There was no evidence for stenosis. There was trivial regurgitation. TRICUSPID VALVE: The valve structure was normal. There was no evidence for  stenosis. There was no regurgitation. PULMONIC VALVE: Not well visualized. There was no evidence for stenosis. There  was no insufficiency. PERICARDIUM: There was no pericardial effusion. AORTA: The root exhibited normal size. SUMMARY:    -  Left ventricle: Systolic function was normal. Ejection fraction was  estimated in the range of 60 % to 65 %. There were no regional wall motion  abnormalities. -  Atrial septum: Agitated saline contrast injection (bubble study) was  performed. There was no right-to-left shunt, with provocative maneuvers to  increase right atrial pressure. SYSTEM MEASUREMENT TABLES    2D mode  AoR Diam (2D): 3.4 cm  LA Dimension (2D): 2.8 cm  IVS/LVPW (2D): 1  IVSd (2D): 1.2 cm  LVIDd (2D): 3.4 cm  LVIDs (2D): 2 cm  LVOT Area (2D): 3.1 cm2  LVPWd (2D): 1.1 cm    Tissue Doppler Imaging  LV Peak Early Rivera Tissue Harvey; Lateral MA (TDI): 10.1 cm/s  LV Peak Early Rivera Tissue Harvey; Medial MA (TDI): 7.5 cm/s    Unspecified Scan Mode  Peak Grad; Mean; Antegrade Flow: 5 mm[Hg]  Vmax; Antegrade Flow: 115 cm/s  LVOT Diam: 2 cm  MV Peak Harvey/LV Peak Tissue Harvey E-Wave; Lateral MA: 5.7  MV Peak Harvey/LV Peak Tissue Harvey E-Wave; Medial MA: 7.7    Prepared and signed by    Lady Trinity MD [facility]  Signed [id number] 14:03:59           All Micro Results     None          Labs: Results:       BMP, Mg, Phos Recent Labs     11/18/18  0527   *   K 3.4*      CO2 24   AGAP 7   BUN 8   CREA 0.65   CA 8.1*   *   MG 2.0      CBC Recent Labs     11/18/18  0527   WBC 4.7   RBC 3.98*   HGB 12.1   HCT 36.2      GRANS 58   LYMPH 23   EOS 5   MONOS 13*   BASOS 1   IG 0   ANEU 2.7   ABL 1.1   RENATE 0.2   ABM 0.6   ABB 0.0   AIG 0.0      LFT No results for input(s): SGOT, ALT, TBIL, AP, TP, ALB, GLOB, AGRAT, GPT in the last 72 hours.    Cardiac Testing No results found for: BNPP, BNP, CPK, RCK1, RCK2, RCK3, RCK4, CKMB, CKNDX, CKND1, TROPT, TROIQ   Coagulation Tests No results found for: PTP, INR, APTT   A1c Lab Results   Component Value Date/Time    Hemoglobin A1c 5.6 11/16/2018 04:19 AM      Lipid Panel Lab Results   Component Value Date/Time    Cholesterol, total 211 (H) 11/16/2018 04:19 AM    HDL Cholesterol 60 11/16/2018 04:19 AM    LDL, calculated 141.6 (H) 11/16/2018 04:19 AM    VLDL, calculated 9.4 11/16/2018 04:19 AM    Triglyceride 47 11/16/2018 04:19 AM    CHOL/HDL Ratio 3.5 11/16/2018 04:19 AM      Thyroid Panel No results found for: TSH, T4, FT4, TT3, T3U, TSHEXT     Most Recent UA Lab Results   Component Value Date/Time    Color YELLOW 11/17/2018 12:34 AM    Appearance CLEAR 11/17/2018 12:34 AM    Specific gravity 1.017 11/17/2018 12:34 AM    pH (UA) 6.0 11/17/2018 12:34 AM    Protein NEGATIVE  11/17/2018 12:34 AM    Glucose NEGATIVE  11/17/2018 12:34 AM    Ketone NEGATIVE  11/17/2018 12:34 AM    Bilirubin NEGATIVE  11/17/2018 12:34 AM    Blood NEGATIVE  11/17/2018 12:34 AM    Urobilinogen 0.2 11/17/2018 12:34 AM    Nitrites NEGATIVE  11/17/2018 12:34 AM    Leukocyte Esterase NEGATIVE  11/17/2018 12:34 AM        No Known Allergies  Immunization History   Administered Date(s) Administered    TB Skin Test (PPD) Intradermal 11/16/2018       All Labs from Last 24 Hrs:  No results found for this or any previous visit (from the past 24 hour(s)).     Current Med List in Hospital:   Current Facility-Administered Medications   Medication Dose Route Frequency    guaiFENesin ER (MUCINEX) tablet 1,200 mg  1,200 mg Oral Q12H    amLODIPine (NORVASC) tablet 10 mg  10 mg Oral DAILY    metoprolol tartrate (LOPRESSOR) tablet 12.5 mg  12.5 mg Oral Q12H    benzonatate (TESSALON) capsule 100 mg  100 mg Oral TID PRN    levalbuterol (XOPENEX) nebulizer soln 0.63 mg/3 mL  0.63 mg Nebulization Q6HWA RT    LORazepam (ATIVAN) tablet 0.5 mg  0.5 mg Oral Q6H PRN    cyclobenzaprine (FLEXERIL) tablet 10 mg  10 mg Oral TID PRN    influenza vaccine 2018-19 (6 mos+)(PF) (FLUARIX QUAD/FLULAVAL QUAD) injection 0.5 mL  0.5 mL IntraMUSCular PRIOR TO DISCHARGE    sodium chloride (NS) flush 5-10 mL  5-10 mL IntraVENous Q8H    sodium chloride (NS) flush 5-10 mL  5-10 mL IntraVENous PRN    ondansetron (ZOFRAN) injection 4 mg  4 mg IntraVENous Q6H PRN    aspirin chewable tablet 81 mg  81 mg Oral DAILY    atorvastatin (LIPITOR) tablet 40 mg  40 mg Oral QHS    acetaminophen (TYLENOL) tablet 650 mg  650 mg Oral Q6H PRN    enoxaparin (LOVENOX) injection 40 mg  40 mg SubCUTAneous Q24H    nicotine (NICODERM CQ) 21 mg/24 hr patch 1 Patch  1 Patch TransDERmal DAILY    hydrALAZINE (APRESOLINE) 20 mg/mL injection 10 mg  10 mg IntraVENous Q6H PRN       Discharge Exam:  Patient Vitals for the past 24 hrs:   Temp Pulse Resp BP SpO2   11/20/18 0922  96  (!) 151/101    11/20/18 0903     92 %   11/20/18 0800 97.9 °F (36.6 °C) 80 14 134/87 90 %   11/20/18 0727     92 %   11/20/18 0400 97.6 °F (36.4 °C) 81 16 146/85 90 %   11/20/18 0046    (!) 160/91    11/20/18 0001    (!) 163/105    11/20/18 0000 98.4 °F (36.9 °C) 85 18 (!) 161/103 93 %   11/19/18 2057     95 %   11/19/18 2000 98.1 °F (36.7 °C) 98 18 (!) 162/98 90 %   11/19/18 1612 98 °F (36.7 °C) 98 18 (!) 161/105 94 %   11/19/18 1318     94 %   11/19/18 1159 98.1 °F (36.7 °C) 91 17 (!) 160/105 92 %     Oxygen Therapy  O2 Sat (%): 92 % (11/20/18 0903)  Pulse via Oximetry: 92 beats per minute (11/20/18 0727)  O2 Device: Room air (11/20/18 0903)    Intake/Output Summary (Last 24 hours) at 11/20/2018 1113  Last data filed at 11/20/2018 0001  Gross per 24 hour   Intake    Output 300 ml   Net -300 ml       General:    Well nourished. Alert. Eyes:   Normal sclera. Extraocular movements intact. ENT:  Normocephalic, atraumatic. Moist mucous membranes  CV:   Regular rate and rhythm. No murmur, rub, or gallop.     Lungs: Clear to auscultation bilaterally. No wheezing, rhonchi, or rales. Abdomen: Soft, nontender, nondistended. Bowel sounds normal.   Extremities: Warm and dry. No cyanosis or edema. Neurologic: Right side flaccid   Skin:     No rashes or jaundice. Psych:  Normal mood and affect. Discharge Info:   Current Discharge Medication List      START taking these medications    Details   amLODIPine (NORVASC) 10 mg tablet Take 1 Tab by mouth daily. Qty: 30 Tab, Refills: 0      aspirin 81 mg chewable tablet Take 1 Tab by mouth daily. Qty: 30 Tab, Refills: 0      atorvastatin (LIPITOR) 40 mg tablet Take 1 Tab by mouth nightly. Qty: 30 Tab, Refills: 0      nicotine (NICODERM CQ) 21 mg/24 hr 1 Patch by TransDERmal route daily for 30 days. Qty: 30 Patch, Refills: 0      metoprolol tartrate (LOPRESSOR) 25 mg tablet Take 1 Tab by mouth every twelve (12) hours. Qty: 60 Tab, Refills: 0               Disposition: Home with home health     Activity: As tolerated  Diet: DIET CARDIAC Mechanical Soft    Follow-up Appointments   Procedures    FOLLOW UP VISIT Appointment in: Two Weeks PCP in 1-2 weeks     PCP in 1-2 weeks     Standing Status:   Standing     Number of Occurrences:   1     Order Specific Question:   Appointment in     Answer: Two Weeks         Follow-up Information    None         Time spent in patient discharge planning and coordination 35 minutes.     Signed:  Gabrielle Degroot NP

## 2018-11-20 NOTE — PROGRESS NOTES
Dysphagia Goals: LTG: Patient will tolerate least restrictive diet without signs/symptoms of aspiration at discharge for safe swallow function. STG: Patient will tolerate mech soft(cardiac)/thin liquids without overt s/sx of penetration/aspiration STG: Patient will participate in oral motor exercises to improve weakness for safe swallow function Speech/Language/Cog Goals: LTG: Patient will increase speech production to improve intelligibility across all levels STG: Patient will complete oral motor exercises on 8/10 occasions with min cues STG: Pt to increase rate of speech while maintaining intelligibility at word, phrase, convo level with minimal cueing STG: Patient will participate in full cognitive evaluation if deemed appropriate STG: Patient will complete divergent naming tasks with 80% accuracy given mod cueing (added 11/20) STG: Patient will complete automatic naming tasks with 80% accuracy given mod cueing (added 11/20) STG: Patient will complete recall tasks with 80% accuracy given mod cueing (added 11/20) Speech language pathology:  speech language note: Daily Note 2 NAME/AGE/GENDER: Rosana Og is a 61 y.o. female DATE: 11/20/2018 PRIMARY DIAGNOSIS: CVA (cerebral vascular accident) ([facility] [address] ICD-10: Treatment Diagnosis: Oral dysphagia R13.12; Dysarthria following cerebral infarction I69.322 INTERDISCIPLINARY COLLABORATION: Registered NurseASSESSMENT:Patient seen today for speech/language/cog treatment. Patient alert/oriented x4. Patient completed oral motor exercises as outlined below. Recalled dysarthria strategies with 100% accuracy, however inconsistently utilizes strategies independently. Patient participated in speech/lang/cog tasks. Given portions of cognitive eval for therapeutic purposes to further identify cog-linguistic function. Able to repeat at sentence level with 100% accuracy. Difficulty with immediate and delayed recall requiring mod cueing. With ~5 min delay, patient recalled 0/5 items independently, 2/5 given category, and 5/5 given multiple choice. Difficulty with attention requiring repetition to repeat numbers in forward and reverse order. . Significant difficulty with divergent naming fluency task as patient able to name only 3 items in 1 minute. Patient will benefit from continued speech/lang/cog treatment to improve cognitive-linguistic abilities and improve speech intelligibility. Will continue to f/u for diet tolerance x1 and speech/lang/cog treatment. ?????? ? ? This section established at most recent assessment??????????  
PROBLEM LIST (Impairments causing functional limitations): 1. Dysarthria 2. Dysphagia (oral symptoms) REHABILITATION POTENTIAL FOR STATED GOALS: Good PLAN OF CARE:  
Patient will benefit from skilled intervention to address the following impairments. RECOMMENDATIONS AND PLANNED INTERVENTIONS (Benefits and precautions of therapy have been discussed with the patient.): 
· continue prescribed diet MEDICATIONS: 
· With liquid COMPENSATORY STRATEGIES/MODIFICATIONS INCLUDING: 
· Small sips and bites OTHER RECOMMENDATIONS (including follow up treatment recommendations):  
· Oral motor exercises · Family training/education · Patient education RECOMMENDED DIET MODIFICATIONS DISCUSSED WITH: 
· Nursing · Patient FREQUENCY/DURATION: Continue to follow patient 3 times a week for duration of hospital stay to address above goals. RECOMMENDED REHABILITATION/EQUIPMENT: (at time of discharge pending progress): Due to the probability of continued deficits (see above) this patient will likely need continued skilled speech therapy after discharge. SUBJECTIVE:  
Alert/oriented. History of Present Injury/Illness: Ms. Lord Powell  has no past medical history on file. .  She also  has no past surgical history on file. Present Symptoms: dysarthria Pain Intensity 1: 0 
 Current Medications: No current facility-administered medications on file prior to encounter. No current outpatient medications on file prior to encounter. Current Dietary Status:  [facility] soft/thin  
   
 
Social History/Home Situation:   
Home Environment: Private residence # Steps to Enter: 5 One/Two Story Residence: One story Living Alone: No 
Support Systems: Friends \ neighbors Patient Expects to be Discharged to[de-identified] Unknown Current DME Used/Available at Home: Cane, straight Tub or Shower Type: Tub/Shower combination OBJECTIVE:  
Respiratory Status:  Room air CXR Results: no acute MRI Results: Small acute left parietal white matter infarct.  No evidence of hemorrhage. CT Results:Negative for acute intracranial hemorrhage.  Chronic changes. MRI is more sensitive for acute infarct. Oral Motor Structure/Speech:  Oral-Motor Structure/Motor Speech Labial: Decreased rate, Decreased seal, Right droop Dentition: Edentulous Oral Hygiene: fair Lingual: Decreased rate, Decreased strength ORAL MOTOR EXERCISES: 
Labial Pucker: Yes Sets: 2 Reps: 5 Labial Retraction: Yes Sets: 2 Reps: 5 Labial Seal: Yes Sets: 2 Reps: 5 Labial Seal Cheeks Extended: Yes Sets: 2 Reps: 5 Cognitive and Communication Status: 
Neuro-Linguistics: 
 
  Memory Exercises Performed:  
 Recall: Impaired Immediate: 2/5 independently Delayed: 0/5 independently; increased to 5/5 with multiple choice Verbal Organization: Impaired Attention Exercises Performed Task: digits in forward/reverse order Accuracy (%): 0.5 % Cues: Moderate (required repetition of numbers and directions) Verbal Expression:  
 Verbal Expression Primary Mode of Expression: Verbal 
Repetition: No impairment Naming: Impaired Confrontation (%): 100 % Divergent (%): (impaired, only able to name 3 items in 1 min) Conversation: Dysarthric Speech/Lang/Cog Activities: Activities/Procedures listed utilized to improve progress in cog linguistic communication. Required minimal-mod cueing to cog-linguistic function. Tool Used: Dysphagia Outcome and Severity Scale (RD) Score Comments Normal Diet  [] 7 With no strategies or extra time needed Functional Swallow  [] 6 May have mild oral or pharyngeal delay Mild Dysphagia 
  [] 5 Which may require one diet consistency restricted (those who demonstrate penetration which is entirely cleared on MBS would be included) Mild-Moderate Dysphagia  [] 4 With 1-2 diet consistencies restricted Moderate Dysphagia  [] 3 With 2 or more diet consistencies restricted Moderately Severe Dysphagia  [] 2 With partial PO strategies (trials with ST only) Severe Dysphagia  [] 1 With inability to tolerate any PO safely Score:  Initial: 5 Most Recent: X (Date: -- ) Interpretation of Tool: The Dysphagia Outcome and Severity Scale (RD) is a simple, easy-to-use, 7-point scale developed to systematically rate the functional severity of dysphagia based on objective assessment and make recommendations for diet level, independence level, and type of nutrition. Score 7 6 5 4 3 2 1 Modifier CH CI CJ CK CL CM CN ? Swallowing:  
  - CURRENT STATUS: CJ - 20%-39% impaired, limited or restricted  - GOAL STATUS:  CH - 0% impaired, limited or restricted  - D/C STATUS:  ---------------To be determined--------------- Payor: Janelle / Plan: [address] / Product Type: Managed Care Medicaid /  
 
________________________________________________________________________________________________ Safety: After treatment position/precautions: · Up in chair Treatment Assessment:  Patient participated in cognitive linguistic treatment Progression/Medical Necessity:  
· Skilled intervention continues to be required due to dysarthria. Compliance with Program/Exercises: Will assess as treatment progresses Reason for Continuation of Services/Other Comments: 
· Patient continues to require skilled intervention due to dysarthria. Recommendations/Intent for next treatment session: \"Treatment next visit will focus on cognitive-linguistic treatment and diet tolerance\". Total Treatment Duration: 
Time In: 3461 Time Out: [address] CF-SLP

## 2018-11-20 NOTE — PROGRESS NOTES
Problem: Falls - Risk of 
Goal: *Absence of Falls Document Soraya Heart Fall Risk and appropriate interventions in the flowsheet. Outcome: Progressing Towards Goal 
Fall Risk Interventions: 
Mobility Interventions: Bed/chair exit alarm, Communicate number of staff needed for ambulation/transfer, Patient to call before getting OOB Mentation Interventions: Door open when patient unattended, Reorient patient, Toileting rounds Medication Interventions: Patient to call before getting OOB, Bed/chair exit alarm Elimination Interventions: Call light in reach History of Falls Interventions: Bed/chair exit alarm Problem: Pressure Injury - Risk of 
Goal: *Prevention of pressure injury Document Moshe Scale and appropriate interventions in the flowsheet. Outcome: Progressing Towards Goal 
Pressure Injury Interventions: 
Sensory Interventions: Assess changes in LOC, Check visual cues for pain Moisture Interventions: Absorbent underpads, Internal/External urinary devices Activity Interventions: PT/OT evaluation, Pressure redistribution bed/mattress(bed type), Increase time out of bed Mobility Interventions: Pressure redistribution bed/mattress (bed type), PT/OT evaluation, HOB 30 degrees or less Nutrition Interventions: Offer support with meals,snacks and hydration Friction and Shear Interventions: HOB 30 degrees or less

## 2018-11-20 NOTE — PROGRESS NOTES
Hospitalist Progress Note Subjective:  
Daily Progress Note: 11/19/2018 1800 Patient with baseline learning disorder presented to ER 11/15 with difficulty walking for the prior 2.5 days with right side weakness without any additional symptoms.  Several falls on day prior to presentation.  Leukopenia with admission WBC: 3.7 and Na+: 131.  MRI done in ER with small left parietal infarct.   
11/16: [facility] referral. Elizabeth Brantley now slurred, SLP in with right side lingual deviation noted.  Downgrade to mechanical soft and thin liquids.  OT, PT evals, needs maximal assist, right upper extremity severely impaired at present, patient is right hand dominant.  Some sensation intact.  Dr Jessica Chaidez MD in for eval.  Carotid U/S as noted below.  Begun on ASA and statin.   
11/17:  Able to wiggle right fingers very slightly.  Continues to be unable to move right arm and leg.  Speech remains slurred, but able to understand. Supportive niece at bedside. Blood pressure up to 181/115.  May need to increase po antihypertensives tomorrow.   
 11/18:  Norvasc dose  Increased to 10 mg. Attempting to participate in PT. Still unable to bear any weight on right leg. Frustrated, but remains with positive attitude. Niece at bedside. CM in, attempting to plan for discharge. May not have rehab benefits.   
  
11/19:  Blood pressure not improved much with addition of meds. Declined by 2 Rehab centers due to no STR benefits. Will have to go home with [facility]. Desperately needs focused rehab. Was independent prior to stroke. Working diligently with PT/OT, extremely motivated. SLP in, doing well with mechanical soft and thin liquids. No new complaints. ADDITIONAL HISTORY:  HTN, tobacco abuse 
  
Current Facility-Administered Medications Medication Dose Route Frequency  guaiFENesin ER (MUCINEX) tablet 1,200 mg  1,200 mg Oral Q12H  
 amLODIPine (NORVASC) tablet 10 mg  10 mg Oral DAILY  metoprolol tartrate (LOPRESSOR) tablet 12.5 mg  12.5 mg Oral Q12H  
 benzonatate (TESSALON) capsule 100 mg  100 mg Oral TID PRN  
 levalbuterol (XOPENEX) nebulizer soln 0.63 mg/3 mL  0.63 mg Nebulization Q6HWA RT  
 LORazepam (ATIVAN) tablet 0.5 mg  0.5 mg Oral Q6H PRN  
 cyclobenzaprine (FLEXERIL) tablet 10 mg  10 mg Oral TID PRN  
 influenza vaccine - (6 mos+)(PF) (FLUARIX QUAD/FLULAVAL QUAD) injection 0.5 mL  0.5 mL IntraMUSCular PRIOR TO DISCHARGE  sodium chloride (NS) flush 5-10 mL  5-10 mL IntraVENous Q8H  
 sodium chloride (NS) flush 5-10 mL  5-10 mL IntraVENous PRN  
 ondansetron (ZOFRAN) injection 4 mg  4 mg IntraVENous Q6H PRN  
 aspirin chewable tablet 81 mg  81 mg Oral DAILY  atorvastatin (LIPITOR) tablet 40 mg  40 mg Oral QHS  acetaminophen (TYLENOL) tablet 650 mg  650 mg Oral Q6H PRN  
 enoxaparin (LOVENOX) injection 40 mg  40 mg SubCUTAneous Q24H  
 nicotine (NICODERM CQ) 21 mg/24 hr patch 1 Patch  1 Patch TransDERmal DAILY  hydrALAZINE (APRESOLINE) 20 mg/mL injection 10 mg  10 mg IntraVENous Q6H PRN Review of Systems A comprehensive review of systems was negative except for that written in the HPI. Objective:  
 
Visit Vitals /85 (BP 1 Location: Left arm, BP Patient Position: At rest) Pulse 81 Temp 97.6 °F (36.4 °C) Resp 16 SpO2 90% Breastfeeding? No  
   O2 Device: Room air Temp (24hrs), Av °F (36.7 °C), Min:97.6 °F (36.4 °C), Max:98.4 °F (36.9 °C) No intake/output data recorded.  1901 -  0700 In: -  
Out: 426 [[id number]] General appearance: Oriented and alert, cooperative, working with PT.  Complains of back pain, unable to get comfortable, new bed provided with concern for high risk for skin breakdown. Niece at bedside. Head: Normocephalic, without obvious abnormality, atraumatic Eyes: conjunctivae/corneas clear. PERRL Neck: supple, symmetrical, trachea midline, no carotid bruit and no JVD Lungs: Loose cough with small amounts thick sputum, scattered wheezes, otherwise clear to auscultation bilaterally. Aerosols. Heart: regular rate and rhythm, S1, S2 normal, no murmur, click, rub or gallop Abdomen: soft, non-tender. Bowel sounds normal. No masses,  no organomegaly Extremities: right arm and leg with very faint motion of digits. Unable to .  Feeling some sensation.  Left side extremities normal, atraumatic, no cyanosis or edema Skin: Skin color, texture, turgor normal. No rashes or lesions Neurologic: Left side grossly normal.  Right as above.  
  
Additional comments: Notes,orders, test results, vitals reviewed Data Review CT HEAD: 11/15: No acute intraparenchymal hemorrhage or abnormal extra-axial fluid collection.  The ventricles are normal size. Fairly extensive white matter low attenuation is present, especially frontal lobe nonspecific, likely chronic small vessel disease.  Old small pontine infarct on the right. No midline shift 
mass effect.  Included portion of the paranasal sinuses and orbits grossly unremarkable. IMPRESSION:  Negative for acute intracranial hemorrhage. Chronic changes. MRI is more sensitive for acute infarct. 
  
MRI BRAIN:  11/15: There is a small acute lacunar infarcts in the left corona radiata.  There is no associated hemorrhage.  No other areas of acute infarction are seen. Lindalee Kylah is an old lacunar infarct in the right midbrain.  There is moderate chronic change in the cerebellum and periventricular white matter. .  The ventricles are normal in size.  There are no extra-axial fluid collections.  There are no intracranial masses. Lindalee Kylah are no bony lesions.  The sinuses are clear. IMPRESSION: Small acute left parietal white matter infarct.  No evidence of hemorrhage. 
  
CXR: 11/15: The lungs are clear.  There are no infiltrates or effusions.  The heart size is normal.  The aorta is slightly prominent and atherosclerotic.   
 IMPRESSION: No acute findings in the chest 
  
CAROTID DOPPLERS: 11/15:  There is significant calcified plaque in the right carotid bulb, but no significant stenosis based on flow velocities.   There is normal antegrade flow 
in the right vertebral artery. There is also significant calcified plaque in the left carotid bulb and proximal left internal carotid artery.  Again, there is no significant increase in flow velocity. There is normal antegrade flow in the left vertebral artery. IMPRESSION: Extensive atherosclerosis in the carotid arteries bilaterally.  No utrasound evidence of significant carotid stenosis.  
 
Assessment/Plan:  
Acute left side CVA with right side paralysis             Stroke protocols Continue PT/OT Not looking positive for rehab on discharge Hypertensive urgency             Permissive hypertension 
            Prn hydralazine             norvasc 5 mg added 11/16, increased to 10 mg            (home dose) on 11/17 Begin lopressor 12.5 q 12 hours 11/18 Longstanding, ongoing Tobacco use             Nicotine patch Hyponatremia: resolving with IVF Leukopenia of unclear etiology.   
            monitor Cough Tessalon prn Aerosols Most likely will need to go home with [facility] Care Plan discussed with: Patient, niece and Nurse Signed By: Kamlesh Hein NP November 19, 2018

## 2018-11-20 NOTE — PROGRESS NOTES
Problem: Self Care Deficits Care Plan (Adult) Goal: *Acute Goals and Plan of Care (Insert Text) 1. Patient will feed self entire meal with setup and adaptive utensils as needed. 2. Patient will complete total body dressing and bathing with minimal assistance and adaptive equipment as needed. 3. Patient will participate in BUE therapeutic exercises to increase strength in RUE to at least 2/5 for participation in ADLs and functional transfers. 4. Patient will participate in [address] [place] [address] therapeutic activities to increase coordination in RUE to [facility] for bimanual fine motor ADLs. 5. Patient will attend to right side for 100% of treatment session with no verbal cues from therapist.  
6. Patient will attempt standing in preparation for functional transfers. Timeframe: 7 visits OCCUPATIONAL THERAPY: Daily Note and Treatment Day: 2nd 11/20/2018INPATIENT: Hospital Day: 6 Payor: Janelle / Plan: [id number] / Product Type: Managed Care Medicaid /  
  
NAME/AGE/GENDER: Maycol Savage is a 61 y.o. female PRIMARY DIAGNOSIS:  CVA (cerebral vascular accident) [facility]) CVA (cerebral vascular accident) ([facility] [address] CVA (cerebral vascular accident) ([facility] [address] ICD-10: Treatment Diagnosis:  
 · Generalized Muscle Weakness (M62.81) · Other lack of cordination (R27.8) Precautions/Allergies: 
   Patient has no known allergies. ASSESSMENT:  
Ms. Lord Powell is a 61year old female admitted with R sided weakness, MRI showed L parietal CVA. At baseline patient lives with her aunt and is typically independent with ADLs. 11/20/2018: Patient up in chair upon arrival, agreeable to OT treatment. Reports no pain. Still with 0/5 strength in RUE but patient reports sensation intact. Educated patient on PROM to RUE and proper positioning. Tolerated 10 reps of RUE PROM. Practiced self care with total hand over hand assistance to RUE.  No progress made today, although patient is motivated to participate. Will continue to follow. This section established at most recent assessment PROBLEM LIST (Impairments causing functional limitations): 1. Decreased Strength 2. Decreased ADL/Functional Activities 3. Decreased Transfer Abilities 4. Decreased Ambulation Ability/Technique 5. Decreased Balance 6. Decreased Activity Tolerance INTERVENTIONS PLANNED: (Benefits and precautions of occupational therapy have been discussed with the patient.) 1. Activities of daily living training 2. Adaptive equipment training 3. Donning&doffing training 4. Group therapy 5. Deon tech training 6. Neuromuscular re-eduation 7. Therapeutic activity 8. Therapeutic exercise TREATMENT PLAN: Frequency/Duration: Follow patient 3x/ week to address above goals. Rehabilitation Potential For Stated Goals: Good RECOMMENDED REHABILITATION/EQUIPMENT: (at time of discharge pending progress): Due to the probability of continued deficits (see above) this patient will likely need continued skilled occupational therapy after discharge. Equipment: ? TBD  
    
 
 
 
OCCUPATIONAL PROFILE AND HISTORY:  
History of Present Injury/Illness (Reason for Referral): 
Per H&P,   
Ms. Jose Alfredo Babcock is a 60 yo female with PMH of HTN, tobacco use who is evaluated with complaints of right sided weakness. She feels dizzy with ambulation and has had several falls. Stopped antihypertensives per PCP. Admits to ongoing smoking. Denies speech changes. CT head negative but MRI brain shows left parietal CVA. Past Medical History/Comorbidities: Ms. Jose Alfredo Babcock  has no past medical history on file. Ms. Jose Alfredo Babcock  has no past surgical history on file. Social History/Living Environment:  
Home Environment: Private residence # Steps to Enter: 5 One/Two Story Residence: One story Living Alone: No 
Support Systems: Friends \ neighbors Patient Expects to be Discharged to[de-identified] Unknown Current DME Used/Available at Home: Cane, straight Tub or Shower Type: Tub/Shower combination Prior Level of Function/Work/Activity: 
Lives with aunt. Independent with ADLs. Dominant Side:  
      RIGHT Number of Personal Factors/Comorbidities that affect the Plan of Care: Brief history (0):  LOW COMPLEXITY ASSESSMENT OF OCCUPATIONAL PERFORMANCE[de-identified]  
Activities of Daily Living:  
Basic ADLs (From Assessment) Complex ADLs (From Assessment) Feeding: Moderate assistance Oral Facial Hygiene/Grooming: Moderate assistance Bathing: Moderate assistance Upper Body Dressing: Moderate assistance Lower Body Dressing: Maximum assistance Toileting: Moderate assistance Instrumental ADL Meal Preparation: Total assistance Homemaking: Total assistance Medication Management: Total assistance Financial Management: Total assistance Grooming/Bathing/Dressing Activities of Daily Living Grooming Grooming Assistance: Total assistance(dependent)(using RUE only ) Washing Face: Total assistance (dependent); Training to use affected extremity as a gross motor assistance Brushing/Combing Hair: Total assistance (dependent); Training to use affected extremity as a gross motor assistance Bed/Mat Mobility Rolling: Minimum assistance Supine to Sit: Moderate assistance Sit to Stand: Moderate assistance Scooting: Moderate assistance;Maximum assistance Most Recent Physical Functioning:  
Gross Assessment: 
AROM: Grossly decreased, non-functional(RUE) PROM: Within functional limits(BUE) Strength: Grossly decreased, non-functional(RUE 0/5, LUE 5/5) Coordination: Grossly decreased, non-functional(RUE ) Tone: Abnormal(RUE) Sensation: Intact(light touch and deep pressure BUE) Posture: 
  
Balance: 
Sitting: Impaired Sitting - Static: Good (unsupported) Sitting - Dynamic: Fair (occasional) Standing: Impaired Standing - Static: Poor Standing - Dynamic : Poor Bed Mobility: Rolling: Minimum assistance Supine to Sit: Moderate assistance Scooting: Moderate assistance;Maximum assistance Wheelchair Mobility: 
  
Transfers: 
Sit to Stand: Moderate assistance Stand to Sit: Moderate assistance Patient Vitals for the past 6 hrs: 
 BP BP Patient Position SpO2 Pulse 11/20/18 0922 (!) 151/101 At rest  96  
11/20/18 1200 (!) 152/94 Sitting 90 % 84  
11/20/18 1357   94 %  Mental Status Neurologic State: Alert Orientation Level: Oriented to person, Oriented to situation Cognition: Follows commands Perception: Appears intact Perseveration: No perseveration noted Safety/Judgement: Fall prevention Physical Skills Involved: 1. Range of Motion 2. Balance 3. Strength 4. Activity Tolerance 5. Sensation 6. Fine Motor Control 7. Gross Motor Control Cognitive Skills Affected (resulting in the inability to perform in a timely and safe manner): 1. Expression Psychosocial Skills Affected: 1. Habits/Routines 2. Environmental Adaptation 3. Self-Awareness 4. Social Roles Number of elements that affect the Plan of Care: 5+:  HIGH COMPLEXITY CLINICAL DECISION MAKING:  
[employer] AM-PAC 6 Clicks Daily Activity Inpatient Short Form How much help from another person does the patient currently need. .. Total A Lot A Little None 1. Putting on and taking off regular lower body clothing? [] 1   [x] 2   [] 3   [] 4  
2. Bathing (including washing, rinsing, drying)? [] 1   [x] 2   [] 3   [] 4  
3. Toileting, which includes using toilet, bedpan or urinal?   [] 1   [x] 2   [] 3   [] 4  
4. Putting on and taking off regular upper body clothing? [] 1   [x] 2   [] 3   [] 4  
5. Taking care of personal grooming such as brushing teeth? [] 1   [x] 2   [] 3   [] 4  
6. Eating meals? [] 1   [x] 2   [] 3   [] 4  
© 2007, Trustees of [facility] [employer], under license to [employer]. All rights reserved Score:  12 Most Recent: X (Date: -- ) Interpretation of Tool:  Represents activities that are increasingly more difficult (i.e. Bed mobility, Transfers, Gait). Score 24 23 22-20 19-15 14-10 9-7 6 Modifier CH CI CJ CK CL CM CN   
 
? Self Care:  
  - CURRENT STATUS: CL - 60%-79% impaired, limited or restricted  - GOAL STATUS: CK - 40%-59% impaired, limited or restricted  - D/C STATUS:  ---------------To be determined--------------- Payor: Janelle / Plan: [facility] Luis / Product Type: Managed Care Medicaid /   
 
Medical NecessiInitial: ty:    
· Patient demonstrates good rehab potential due to higher previous functional level. Reason for Services/Other Comments: 
· Patient continues to require present interventions due to patient's inability to use dominant RUE for functional tasks. Use of outcome tool(s) and clinical judgement create a POC that gives a: MODERATE COMPLEXITY  
 
 
 
TREATMENT:  
(In addition to Assessment/Re-Assessment sessions the following treatments were rendered) Pre-treatment Symptoms/Complaints:   
Pain: Initial:  
Pain Intensity 1: 0  Post Session:  None Therapeutic Activity: (    11 minutes): Therapeutic activities including PROM RUE all joints and planes 10 reps and ADL activity with total hand over hand assist to RUE to improve coordination and dynamic movement of arm - right to improve functional outcomes/ usage. Required maximal   verbal and hand over hand assistance to promote motor control of right, upper extremity(s). Braces/Orthotics/Lines/Etc:  
· IV 
· O2 Device: Room air Treatment/Session Assessment:   
· Response to Treatment:  Tolerated well · Interdisciplinary Collaboration:  
o Occupational Therapist 
o Registered Nurse · After treatment position/precautions:  
o Up in chair 
o Bed alarm/tab alert on 
o Bed/Chair-wheels locked 
o Call light within reach 
o RN notified · Compliance with Program/Exercises: Compliant all of the time. · Recommendations/Intent for next treatment session: \"Next visit will focus on advancements to more challenging activities and reduction in assistance provided\". Total Treatment Duration: OT Patient Time In/Time Out Time In: 9838 Time Out: 1593 Luis Dixon, OTR/L

## 2018-11-20 NOTE — PROGRESS NOTES
Patient's rolling walker and 3-in-1 BSC were delivered to room today. Patient requested a wheelchair, and PT informed that a wheelchair could potentially be beneficial. CM contacted [address] to inquire about wheelchair, but was informed that patient does not currently meet criteria for a wheelchair through her insurance. Wheelchair cannot be ordered at this time. DME needs appear to be met. CM discussed patient's plan for discharge with team and patient during IDT rounds on this date. Per NP, Warden Wright, family is now agreeable to [facility] services for PT/OT. Anticipate discharge to home with [facility] tomorrow.

## 2018-11-21 VITALS
OXYGEN SATURATION: 98 % | RESPIRATION RATE: 16 BRPM | TEMPERATURE: 98.2 F | HEART RATE: 79 BPM | SYSTOLIC BLOOD PRESSURE: 156 MMHG | DIASTOLIC BLOOD PRESSURE: 101 MMHG

## 2018-11-21 LAB
ANION GAP SERPL CALC-SCNC: 9 MMOL/L (ref 7–16)
BUN SERPL-MCNC: 12 MG/DL (ref 8–23)
CALCIUM SERPL-MCNC: 8.8 MG/DL (ref 8.3–10.4)
CHLORIDE SERPL-SCNC: 99 MMOL/L (ref 98–107)
CO2 SERPL-SCNC: 26 MMOL/L (ref 21–32)
CREAT SERPL-MCNC: 0.89 MG/DL (ref 0.6–1)
GLUCOSE SERPL-MCNC: 126 MG/DL (ref 65–100)
POTASSIUM SERPL-SCNC: 3.4 MMOL/L (ref 3.5–5.1)
SODIUM SERPL-SCNC: 134 MMOL/L (ref 136–145)

## 2018-11-21 PROCEDURE — 74011250637 HC RX REV CODE- 250/637: Performed by: NURSE PRACTITIONER

## 2018-11-21 PROCEDURE — 36415 COLL VENOUS BLD VENIPUNCTURE: CPT

## 2018-11-21 PROCEDURE — 74011250637 HC RX REV CODE- 250/637: Performed by: INTERNAL MEDICINE

## 2018-11-21 PROCEDURE — 94640 AIRWAY INHALATION TREATMENT: CPT

## 2018-11-21 PROCEDURE — 74011250636 HC RX REV CODE- 250/636: Performed by: INTERNAL MEDICINE

## 2018-11-21 PROCEDURE — 80048 BASIC METABOLIC PNL TOTAL CA: CPT

## 2018-11-21 PROCEDURE — 74011000250 HC RX REV CODE- 250: Performed by: INTERNAL MEDICINE

## 2018-11-21 PROCEDURE — 97530 THERAPEUTIC ACTIVITIES: CPT

## 2018-11-21 RX ORDER — ADHESIVE BANDAGE
30 BANDAGE TOPICAL DAILY PRN
Status: DISCONTINUED | OUTPATIENT
Start: 2018-11-21 | End: 2018-11-21 | Stop reason: HOSPADM

## 2018-11-21 RX ADMIN — Medication 10 ML: at 06:00

## 2018-11-21 RX ADMIN — LEVALBUTEROL HYDROCHLORIDE 0.63 MG: 0.63 SOLUTION RESPIRATORY (INHALATION) at 07:17

## 2018-11-21 RX ADMIN — ASPIRIN 81 MG 81 MG: 81 TABLET ORAL at 09:46

## 2018-11-21 RX ADMIN — AMLODIPINE BESYLATE 10 MG: 10 TABLET ORAL at 09:45

## 2018-11-21 RX ADMIN — GUAIFENESIN 1200 MG: 600 TABLET, EXTENDED RELEASE ORAL at 09:46

## 2018-11-21 RX ADMIN — ENOXAPARIN SODIUM 40 MG: 40 INJECTION, SOLUTION INTRAVENOUS; SUBCUTANEOUS at 09:45

## 2018-11-21 RX ADMIN — METOPROLOL TARTRATE 12.5 MG: 25 TABLET ORAL at 09:45

## 2018-11-21 NOTE — PROGRESS NOTES
CM spoke with patient and niece, Keila Pak, regarding [facility] services for PT/OT. They verbalized agreement for referral to [facility]. Order and referral sent by fax on this date. Patient discharging to home today with [facility] and round-the-clock care from patient's family. DME delivered by [facility] (RW and 3-in-1BSC). CM educated niece about methods of obtaining a wheelchair for patient should she require one. Care Management Interventions PCP Verified by CM: Yes([facility]) Mode of Transport at Discharge: Other (see comment)(family ) Transition of Care Consult (CM Consult): Discharge Planning Discharge Durable Medical Equipment: No 
Physical Therapy Consult: Yes Occupational Therapy Consult: Yes Speech Therapy Consult: Yes Current Support Network: Own Home, Family Lives [place] Confirm Follow Up Transport: Family Plan discussed with Pt/Family/Caregiver: Yes Freedom of Choice Offered: Yes Discharge Location Discharge Placement: Rehab hospital/unit acute

## 2018-11-21 NOTE — PROGRESS NOTES
Discharge instructions  all new medications,medication side effects sheet, follow up appointment and  prescriptions reviewed and explained to the patient and niece. Patient and niece verbalize understanding of instructions. A copy of discharge instructions and prescriptions  have been given to niece. Opportunity for questions provided.

## 2018-11-21 NOTE — PROGRESS NOTES
Pt discharged with PCT to family in awaiting vehicle. All belongings with patient. Discharge instructions given by staff to pt.

## 2018-11-21 NOTE — PROGRESS NOTES
Problem: Falls - Risk of 
Goal: *Absence of Falls Document Dannial Shadow Fall Risk and appropriate interventions in the flowsheet. Outcome: Progressing Towards Goal 
Fall Risk Interventions: 
Mobility Interventions: Bed/chair exit alarm, Communicate number of staff needed for ambulation/transfer, Patient to call before getting OOB Mentation Interventions: Door open when patient unattended Medication Interventions: Patient to call before getting OOB, Bed/chair exit alarm Elimination Interventions: Call light in reach, Patient to call for help with toileting needs History of Falls Interventions: Bed/chair exit alarm Problem: Pressure Injury - Risk of 
Goal: *Prevention of pressure injury Document Moshe Scale and appropriate interventions in the flowsheet. Outcome: Progressing Towards Goal 
Pressure Injury Interventions: 
Sensory Interventions: Assess changes in LOC, Check visual cues for pain Moisture Interventions: Absorbent underpads, Internal/External urinary devices Activity Interventions: Increase time out of bed, Pressure redistribution bed/mattress(bed type), PT/OT evaluation Mobility Interventions: HOB 30 degrees or less, PT/OT evaluation, Pressure redistribution bed/mattress (bed type) Nutrition Interventions: Document food/fluid/supplement intake Friction and Shear Interventions: HOB 30 degrees or less

## 2018-11-21 NOTE — PROGRESS NOTES
Problem: Mobility Impaired (Adult and Pediatric) Goal: *Acute Goals and Plan of Care (Insert Text) STG: 
(1.)Ms. Charles Wood will move from supine to sit and sit to supine , scoot up and down and roll side to side with MODERATE ASSIST within 3 treatment day(s). (2.)Ms. Charles Wood will transfer from bed to chair and chair to bed with MAXIMAL ASSIST using the least restrictive device within 3 treatment day(s). (3.)Ms. Charles Wood will ambulate with MAXIMAL ASSIST for 10 feet with the least restrictive device within 3 treatment day(s). (4.)Ms. Charles Wood will maintain static/dynamic sitting x 12 minutes with at least FAIR balance for improved safety within 3 days. LTG: 
(1.)Ms. Charles Wood will move from supine to sit and sit to supine , scoot up and down and roll side to side in bed with MINIMAL ASSIST within 7 treatment day(s). (2.)Ms. Charles Wood will transfer from bed to chair and chair to bed with MINIMAL ASSIST using the least restrictive device within 7 treatment day(s). (3.)Ms. Charles Wood will ambulate with MINIMAL ASSIST for 50+ feet with the least restrictive device within 7 treatment day(s). (4.)Ms. Charles Wood will maintain static/dynamic sitting x 12 minutes with at least FAIR + balance for improved safety within 7 days. ________________________________________________________________________________________________ PHYSICAL THERAPY: Daily Note, Treatment Day: 3rd, AM 11/21/2018INPATIENT: Hospital Day: 7 Payor: Janelle / Plan: [id number] [facility] / Product Type: Managed Care Medicaid /  
  
NAME/AGE/GENDER: Demarcus Yao is a 61 y.o. female PRIMARY DIAGNOSIS: CVA (cerebral vascular accident) [facility] CVA (cerebral vascular accident) ([facility] [address] CVA (cerebral vascular accident) ([facility] [address] ICD-10: Treatment Diagnosis:  
 · Generalized Muscle Weakness (M62.81) · Difficulty in walking, Not elsewhere classified (R26.2) · History of falling (Z91.81) Precaution/Allergies: Patient has no known allergies. ASSESSMENT:  
Ms. Jacquelin Castorena is a 61 y.o. female in the hospital for the above who was supine in bed upon arrival. Very agreeable to therapy. Pt reports that she lives in a one story house with a friend that has 5 steps to enter. Pt also reported that PTA she was independent with ADLs and ambulated with independence. Pt admitted to one recent falls in the past year. Bed mobility requires min to moderate assist however patient is trying to assist.  Sitting balance edge of bed fair to good however patient did have one episode of loss of balance while drinking her chocolate milk. Sit to stand with moderate assist however when using the hemiwalker the patient has increased right lateral lean that required max to total assist.  Stand pivot transfer to  the recliner with max assist.  Stood several time with the some improvement with increased assist and verbal/tactile cues. Patient has decreased standing tolerance and decreased upright posture. Patient required assistance to move back in the recliner. Tolerated well. Making progress as patient is very motivated and cooperative. Patient is left in the recliner with alarm intact and needs within reach. Ms. Jacquelin Castorena could benefit from skilled PT as she is currently functioning below her baseline. Additional skilled therapy would be very beneficial at discharge. Will continue PT efforts. . 
 
 
This section established at most recent assessment PROBLEM LIST (Impairments causing functional limitations): 1. Decreased Strength 2. Decreased ADL/Functional Activities 3. Decreased Transfer Abilities 4. Decreased Ambulation Ability/Technique 5. Decreased Balance INTERVENTIONS PLANNED: (Benefits and precautions of physical therapy have been discussed with the patient.) 1. Balance Exercise 2. Bed Mobility 3. Family Education 4. Gait Training 5. Neuromuscular Re-education/Strengthening 6. Therapeutic Activites 7. Therapeutic Exercise/Strengthening 8. Transfer Training TREATMENT PLAN: Frequency/Duration: 3 times a week for duration of hospital stay Rehabilitation Potential For Stated Goals: Good RECOMMENDED REHABILITATION/EQUIPMENT: (at time of discharge pending progress): Due to the probability of continued deficits (see above) this patient will likely need continued skilled physical therapy after discharge. Equipment:  
? None at this time HISTORY:  
History of Present Injury/Illness (Reason for Referral): 
See H&P Past Medical History/Comorbidities: Ms. Nora Casanova  has no past medical history on file. Ms. Nora Casanova  has no past surgical history on file. Social History/Living Environment:  
Home Environment: Private residence # Steps to Enter: 5 One/Two Story Residence: One story Living Alone: No 
Support Systems: Friends \ neighbors Patient Expects to be Discharged to[de-identified] Unknown Current DME Used/Available at Home: Cane, straight Tub or Shower Type: Tub/Shower combination Prior Level of Function/Work/Activity: 
Lives with friend in one story house and PTA pt was independent with ADLs and ambulation. One recent fall reported. Number of Personal Factors/Comorbidities that affect the Plan of Care: 0: LOW COMPLEXITY EXAMINATION:  
Most Recent Physical Functioning:  
Gross Assessment: 
  
         
  
Posture: 
  
Balance: 
Sitting: Impaired Sitting - Static: Good (unsupported) Sitting - Dynamic: Fair (occasional) Standing: Impaired Standing - Static: Poor Standing - Dynamic : Poor Bed Mobility: 
Rolling: Minimum assistance Supine to Sit: Moderate assistance Scooting: Maximum assistance Wheelchair Mobility: 
  
Transfers: 
Sit to Stand: Moderate assistance;Maximum assistance(with the use of the mandy walker severe right lateral lean) Stand to Sit: Moderate assistance Stand Pivot Transfers: Maximum assistance Gait: 
  
   
  
Body Structures Involved: 1. Nerves 2. Muscles Body Functions Affected: 1. Neuromusculoskeletal 
2. Movement Related Activities and Participation Affected: 1. General Tasks and Demands 2. Mobility 3. Self Care 4. Domestic Life 5. Community, Social and [place] Number of elements that affect the Plan of Care: 4+: HIGH COMPLEXITY CLINICAL PRESENTATION:  
Presentation: Stable and uncomplicated: LOW COMPLEXITY CLINICAL DECISION MAKING:  
[facility] AM-PAC 6 Clicks Basic Mobility Inpatient Short Form How much difficulty does the patient currently have. .. Unable A Lot A Little None 1. Turning over in bed (including adjusting bedclothes, sheets and blankets)? [] 1   [] 2   [x] 3   [] 4  
2. Sitting down on and standing up from a chair with arms ( e.g., wheelchair, bedside commode, etc.)   [] 1   [x] 2   [] 3   [] 4  
3. Moving from lying on back to sitting on the side of the bed? [] 1   [x] 2   [] 3   [] 4 How much help from another person does the patient currently need. .. Total A Lot A Little None 4. Moving to and from a bed to a chair (including a wheelchair)? [] 1   [x] 2   [] 3   [] 4  
5. Need to walk in hospital room? [x] 1   [] 2   [] 3   [] 4  
6. Climbing 3-5 steps with a railing? [x] 1   [] 2   [] 3   [] 4  
© 2007, Trustees of [facility], under license to [employer]. All rights reserved Score:  Initial: 11 Most Recent: X (Date: -- ) Interpretation of Tool:  Represents activities that are increasingly more difficult (i.e. Bed mobility, Transfers, Gait). Score 24 23 22-20 19-15 14-10 9-7 6 Modifier CH CI CJ CK CL CM CN   
 
? Mobility - Walking and Moving Around:  
  - CURRENT STATUS: CL - 60%-79% impaired, limited or restricted  - GOAL STATUS: CK - 40%-59% impaired, limited or restricted  - D/C STATUS:  ---------------To be determined--------------- Payor: Janelle / Plan: [address] [place] [address] Avenue / Product Type: Managed Care Medicaid /   
 
 Medical Necessity:    
· Patient demonstrates good rehab potential due to higher previous functional level. Reason for Services/Other Comments: 
· Patient continues to require skilled intervention due to decreased functional mobility and balance. Use of outcome tool(s) and clinical judgement create a POC that gives a: Clear prediction of patient's progress: LOW COMPLEXITY  
  
 
 
 
TREATMENT:  
(In addition to Assessment/Re-Assessment sessions the following treatments were rendered) Pre-treatment Symptoms/Complaints:  \"Good morning\" Pain: Initial:  
Pain Intensity 1: 0  Post Session:  0 Therapeutic Activity: (    28  minutes): Therapeutic activities including rolling each direction, scooting up in bed, and education on handling of R paretic arm to improve mobility, strength and coordination. Required moderate to max assist  With moderate cues for enagagement   to promote coordination of bilateral, upper extremity(s), lower extremity(s). Therapeutic Exercise: (  minutes):  Exercises per grid below to improve mobility, strength, balance and coordination. Required moderate  verbal and tactile cues to promote proper body posture. Progressed repetitions and complexity of movement as indicated. Date: 
11/19/18 Date: 
 Date: Activity/Exercise Parameters Parameters Parameters Ankle pumps 30 LAQ 30    
marching 30    
abduction 30 Braces/Orthotics/Lines/Etc:  
· O2 Device: Room air Treatment/Session Assessment:   
· Response to Treatment:  Tolerated well · Interdisciplinary Collaboration:  
o Physical Therapy Assistant 
o Registered Nurse · After treatment position/precautions:  
o Up in chair 
o Bed alarm/tab alert on 
o Bed/Chair-wheels locked 
o Call light within reach 
o RN notified · Compliance with Program/Exercises: Will assess as treatment progresses · Recommendations/Intent for next treatment session:   \"Next visit will focus on advancements to more challenging activities and reduction in assistance provided\". Total Treatment Duration: PT Patient Time In/Time Out Time In: 0900 Time Out: 0357 Carli Ibrahim, PTA

## 2018-11-21 NOTE — DISCHARGE SUMMARY
Hospitalist Discharge Summary     Admit Date:  11/15/2018 11:00 AM   Name:  Mary Carmen Last   Age:  61 y.o.  :  1955   MRN:  [id number]   PCP:  None  Treatment Team: Attending Provider: Jayna Cornelius MD; Utilization Review: Eri Lyons; Care Manager: Lucius Del Toro; Consulting Provider: Jagdish Larson MD; Charge Nurse: Ubaldo Lance RN; Charge Nurse: Fran Peguero Speech Language Pathologist: Kalee Hinojosa    Problem List for this Hospitalization:  Hospital Problems as of 2018 Never Reviewed          Codes Class Noted - Resolved POA    * (Principal) CVA (cerebral vascular accident) [facility]) ICD-10-CM: I63.9  ICD-9-CM: 434.91  11/15/2018 - Present Unknown        Hypertension (Chronic) ICD-10-CM: I10  ICD-9-CM: 401.9  11/15/2018 - Present Yes        Tobacco use (Chronic) ICD-10-CM: Z72.0  ICD-9-CM: 305.1  11/15/2018 - Present Yes        Hyponatremia ICD-10-CM: E87.1  ICD-9-CM: 276.1  11/15/2018 - Present Yes        Leukopenia ICD-10-CM: T91.702  ICD-9-CM: 288.50  11/15/2018 - Present Yes                Admission HPI from 11/15/2018:    \"Ms. Jolly Jacob is a 60 yo female with PMH of HTN, tobacco use who is evaluated with complaints of right sided weakness. She feels dizzy with ambulation and has had several falls. Stopped antihypertensives per PCP. Admits to ongoing smoking. Denies speech changes. CT head negative but MRI brain shows left parietal CVA. \"    Hospital Course: As previously noted pt was evaluated for sudden onset of right sided weakness. Pt with PMH of HTN,  Tobacco abuse. Pt reported feeling dizzy and has several falls. Pt's PCP d/c her HTN meds 2/2 low BP. Pt reports that she still smokes. Pt was evaluated by therapies and is appropriate for STR but pt refuses. Pt insists on going home, is agreeable to home health follow up. Follow up instructions and discharge meds at bottom of this note.   Plan was discussed with patient, niece, care team.  All questions answered. Patient was stable at time of discharge. Diagnostic Imaging/Tests:   Xr Chest Sngl V    Result Date: 11/15/2018  Chest X-ray INDICATION:   Chest congestion A portable AP view of the chest was obtained. FINDINGS: The lungs are clear. There are no infiltrates or effusions. The heart size is normal.  The aorta is slightly prominent and atherosclerotic. IMPRESSION: No acute findings in the chest     Mri Brain Wo Cont    Result Date: 11/15/2018  MRI of the brain INDICATION:  Right arm and hand weakness Standard MRI sequences were obtained through the brain in multiple planes without IV contrast FINDINGS: There is a small acute lacunar infarcts in the left corona radiata. There is no associated hemorrhage. No other areas of acute infarction are seen. There is an old lacunar infarct in the right midbrain. There is moderate chronic change in the cerebellum and periventricular white matter. .  The ventricles are normal in size. There are no extra-axial fluid collections. There are no intracranial masses. There are no bony lesions. The sinuses are clear. IMPRESSION: Small acute left parietal white matter infarct. No evidence of hemorrhage. Ct Head Wo Cont    Result Date: 11/15/2018  CT HEAD WITHOUT CONTRAST. INDICATION: Right-sided weakness. COMPARISON: None. TECHNIQUE:   5 mm axial scans from the skull base to the vertex. Our CT scanners use one or more of the following:  Automated exposure control, adjustment of the mA and or kV according to patient size, iterative reconstruction. FINDINGS:  No acute intraparenchymal hemorrhage or abnormal extra-axial fluid collection. The ventricles are normal size. Fairly extensive white matter low attenuation is present, especially frontal lobe nonspecific, likely chronic small vessel disease. Old small pontine infarct on the right. No midline shift mass effect. Included portion of the paranasal sinuses and orbits grossly unremarkable. IMPRESSION:  Negative for acute intracranial hemorrhage. Chronic changes. MRI is more sensitive for acute infarct. Duplex Carotid Bilateral    Result Date: 11/15/2018  INDICATION:  Acute left parietal infarct. TECHNIQUE: Grayscale, color, and spectral duplex Doppler interrogation performed. NASCET criteria was utilized. FINDINGS: -                 CCA cm/sec         ICA cm/sec                      -                 PSV      EDV         PSV      EDV       ICA/CCA ratio Right           115       32            124        49               1.1 Left             104       19            114        21               1.1 There is significant calcified plaque in the right carotid bulb, but no significant stenosis based on flow velocities. There is normal antegrade flow in the right vertebral artery. There is also significant calcified plaque in the left carotid bulb and proximal left internal carotid artery. Again, there is no significant increase in flow velocity. There is normal antegrade flow in the left vertebral artery. IMPRESSION: Extensive atherosclerosis in the carotid arteries bilaterally. No ultrasound evidence of significant carotid stenosis. .       Echocardiogram results:  Results for orders placed or performed during the hospital encounter of 11/15/18   2D ECHO COMPLETE ADULT (TTE) W OR WO CONTR    Narrative    [place] [address] [address]  [phone]    Transthoracic Echocardiogram  2D, M-mode, Doppler, and Color Doppler    Patient: [facility] IDRIS  MR #: [id number]  : 1955  Age: 61 years  Gender: Female  Study date: 2018  Account #: [de-identified]  Height: 66.9 in  Weight: 162.6 lb  BSA: 1.85 mï¾²  Status:Routine  Location: 706  BP: 127/ 74    Allergies: NO KNOWN ALLERGENS    Sonographer:  Ab Sawyer RDCS  Group:  [facility]  Referring Physician:  Bradly Arroyo.  Suly Concepcion MD  Reading Physician:  Ruthie Martinez. Nicanor Lino MD [facility]    INDICATIONS: CVA. *Technically difficult study due to poor patient compliance. Patient moving  throughout study, and would not stay on left side. Coughing throughout study. Limited windows due to lung interference. Some images obtained from   subcostall  window. *    PROCEDURE: This was a routine study. A transthoracic echocardiogram was  performed. The study included complete 2D imaging, M-mode, complete spectral  Doppler, and color Doppler. Intravenous contrast (Definity) was administered. Intravenous contrast (agitated saline) was administered. Echocardiographic  views were limited by restricted patient mobility, poor patient compliance,   and  poor acoustic window availability. This was a technically difficult study. LEFT VENTRICLE: Size was normal. Systolic function was normal. Ejection  fraction was estimated in the range of 60 % to 65 %. There were no regional  wall motion abnormalities. Wall thickness was normal. The E/e' ratio was 6.7. Left ventricular diastolic function parameters were normal.    RIGHT VENTRICLE: The size was normal. The tricuspid jet envelope definition   was  inadequate for estimation of RV systolic pressure. LEFT ATRIUM: Size was normal.    ATRIAL SEPTUM: Agitated saline contrast injection (bubble study) was   performed. There was no right-to-left shunt, with provocative maneuvers to increase   right  atrial pressure. RIGHT ATRIUM: Size was normal.    SYSTEMIC VEINS: IVC: The inferior vena cava was normal in size and course. AORTIC VALVE: The valve was probably trileaflet. There was no evidence for  stenosis. There was no insufficiency. MITRAL VALVE: Valve structure was normal. There was no evidence for stenosis. There was trivial regurgitation. TRICUSPID VALVE: The valve structure was normal. There was no evidence for  stenosis. There was no regurgitation. PULMONIC VALVE: Not well visualized. There was no evidence for stenosis.    There  was no insufficiency. PERICARDIUM: There was no pericardial effusion. AORTA: The root exhibited normal size. SUMMARY:    -  Left ventricle: Systolic function was normal. Ejection fraction was  estimated in the range of 60 % to 65 %. There were no regional wall motion  abnormalities. -  Atrial septum: Agitated saline contrast injection (bubble study) was  performed. There was no right-to-left shunt, with provocative maneuvers to  increase right atrial pressure. SYSTEM MEASUREMENT TABLES    2D mode  AoR Diam (2D): 3.4 cm  LA Dimension (2D): 2.8 cm  IVS/LVPW (2D): 1  IVSd (2D): 1.2 cm  LVIDd (2D): 3.4 cm  LVIDs (2D): 2 cm  LVOT Area (2D): 3.1 cm2  LVPWd (2D): 1.1 cm    Tissue Doppler Imaging  LV Peak Early Rivera Tissue Harvey; Lateral MA (TDI): 10.1 cm/s  LV Peak Early Rivera Tissue Harvey; Medial MA (TDI): 7.5 cm/s    Unspecified Scan Mode  Peak Grad; Mean; Antegrade Flow: 5 mm[Hg]  Vmax; Antegrade Flow: 115 cm/s  LVOT Diam: 2 cm  MV Peak Harvey/LV Peak Tissue Harvey E-Wave; Lateral MA: 5.7  MV Peak Harvey/LV Peak Tissue Harvey E-Wave; Medial MA: 7.7    Prepared and signed by    Bennett Mullen MD [facility]  Signed [id number] 14:03:59           All Micro Results     None          Labs: Results:       BMP, Mg, Phos Recent Labs     11/21/18  0858   *   K 3.4*   CL 99   CO2 26   AGAP 9   BUN 12   CREA 0.89   CA 8.8   *      CBC No results for input(s): WBC, RBC, HGB, HCT, PLT, GRANS, LYMPH, EOS, MONOS, BASOS, IG, ANEU, ABL, RENATE, ABM, ABB, AIG, HGBEXT, HCTEXT, PLTEXT in the last 72 hours. LFT No results for input(s): SGOT, ALT, TBIL, AP, TP, ALB, GLOB, AGRAT, GPT in the last 72 hours.    Cardiac Testing No results found for: BNPP, BNP, CPK, RCK1, RCK2, RCK3, RCK4, CKMB, CKNDX, CKND1, TROPT, TROIQ   Coagulation Tests No results found for: PTP, INR, APTT   A1c Lab Results   Component Value Date/Time    Hemoglobin A1c 5.6 11/16/2018 04:19 AM      Lipid Panel Lab Results   Component Value Date/Time    Cholesterol, total 211 (H) 11/16/2018 04:19 AM    HDL Cholesterol 60 11/16/2018 04:19 AM    LDL, calculated 141.6 (H) 11/16/2018 04:19 AM    VLDL, calculated 9.4 11/16/2018 04:19 AM    Triglyceride 47 11/16/2018 04:19 AM    CHOL/HDL Ratio 3.5 11/16/2018 04:19 AM      Thyroid Panel No results found for: TSH, T4, FT4, TT3, T3U, TSHEXT     Most Recent UA Lab Results   Component Value Date/Time    Color YELLOW 11/17/2018 12:34 AM    Appearance CLEAR 11/17/2018 12:34 AM    Specific gravity 1.017 11/17/2018 12:34 AM    pH (UA) 6.0 11/17/2018 12:34 AM    Protein NEGATIVE  11/17/2018 12:34 AM    Glucose NEGATIVE  11/17/2018 12:34 AM    Ketone NEGATIVE  11/17/2018 12:34 AM    Bilirubin NEGATIVE  11/17/2018 12:34 AM    Blood NEGATIVE  11/17/2018 12:34 AM    Urobilinogen 0.2 11/17/2018 12:34 AM    Nitrites NEGATIVE  11/17/2018 12:34 AM    Leukocyte Esterase NEGATIVE  11/17/2018 12:34 AM        No Known Allergies  Immunization History   Administered Date(s) Administered    TB Skin Test (PPD) Intradermal 11/16/2018       All Labs from Last 24 Hrs:  Recent Results (from the past 24 hour(s))   METABOLIC PANEL, BASIC    Collection Time: 11/21/18  8:58 AM   Result Value Ref Range    Sodium 134 (L) 136 - 145 mmol/L    Potassium 3.4 (L) 3.5 - 5.1 mmol/L    Chloride 99 98 - 107 mmol/L    CO2 26 21 - 32 mmol/L    Anion gap 9 7 - 16 mmol/L    Glucose 126 (H) 65 - 100 mg/dL    BUN 12 8 - 23 MG/DL    Creatinine 0.89 0.6 - 1.0 MG/DL    GFR est AA >60 >60 ml/min/1.73m2    GFR est non-AA >60 >60 ml/min/1.73m2    Calcium 8.8 8.3 - 10.4 MG/DL       Discharge Exam:  Patient Vitals for the past 24 hrs:   Temp Pulse Resp BP SpO2   11/21/18 0800 98.2 °F (36.8 °C) 79 16 (!) 156/101 98 %   11/21/18 0717     92 %   11/21/18 0415 98.5 °F (36.9 °C) 77 18 134/83 90 %   11/21/18 0002 97.7 °F (36.5 °C) 84 18 (!) 138/94 92 %   11/20/18 2000 98.3 °F (36.8 °C) 98 18 109/54 93 %   11/20/18 1917     96 %   11/20/18 1600 98.6 °F (37 °C) 95 16 (!) 156/104 95 %   11/20/18 [address]  94 %   11/20/18 1200 98.5 °F (36.9 °C) 84 15 (!) 152/94 90 %     Oxygen Therapy  O2 Sat (%): 98 % (11/21/18 0800)  Pulse via Oximetry: 86 beats per minute (11/21/18 0717)  O2 Device: Room air (11/21/18 0717)    Intake/Output Summary (Last 24 hours) at 11/21/2018 1021  Last data filed at 11/21/2018 0800  Gross per 24 hour   Intake 210 ml   Output    Net 210 ml         Physical exam:  General:    Well nourished. Alert. No distress. Eyes:   Normal sclera. Extraocular movements intact. ENT:  Normocephalic, atraumatic. Moist mucous membranes  CV:   Regular rate and rhythm. No murmur, rub, or gallop. Lungs:  Clear to auscultation bilaterally. No wheezing, rhonchi, or rales. Abdomen: Soft, nontender, nondistended. Bowel sounds normal.   Extremities: Warm and dry. No cyanosis or edema. Neurologic: No focal deficits  Skin:     No rashes or jaundice. Psych:  Normal mood and affect. Discharge Info:   Current Discharge Medication List      START taking these medications    Details   amLODIPine (NORVASC) 10 mg tablet Take 1 Tab by mouth daily. Qty: 30 Tab, Refills: 0      aspirin 81 mg chewable tablet Take 1 Tab by mouth daily. Qty: 30 Tab, Refills: 0      atorvastatin (LIPITOR) 40 mg tablet Take 1 Tab by mouth nightly. Qty: 30 Tab, Refills: 0      nicotine (NICODERM CQ) 21 mg/24 hr 1 Patch by TransDERmal route daily for 30 days. Qty: 30 Patch, Refills: 0      metoprolol tartrate (LOPRESSOR) 25 mg tablet Take 1 Tab by mouth every twelve (12) hours. Qty: 60 Tab, Refills: 0               Disposition: home with [facility]  Activity: PT/OT per Home Health  Diet: DIET CARDIAC Mechanical Soft    Follow-up Appointments   Procedures    FOLLOW UP VISIT Appointment in: Two Weeks PCP in 1-2 weeks     PCP in 1-2 weeks     Standing Status:   Standing     Number of Occurrences:   1     Order Specific Question:   Appointment in     Answer:    Two Weeks         Follow-up Information     Follow up With Specialties Details Why Contact Info    Manda 9  Will contact you for first home visit [address] [facility]  [address]    [address]  Schedule an appointment as soon as possible for a visit in 1 week For a follow up appointment [address] [facility] [address] [facility]    None    None ([phone]) Patient stated that they have no PCP            Case reviewed with supervising physician - RAMONITA Blandon MD    Time spent in patient discharge planning and coordination 35 minutes.     Signed:  ADRIAN Gordon

## 2018-11-21 NOTE — PROGRESS NOTES
The documentation for this period is being entered following the guidelines as defined in the [address] [place] [address] downtime policy by Swapnil Dong. From [id number]

## 2018-11-21 NOTE — DISCHARGE INSTRUCTIONS
Activity: PT/OT per Home Health  Diet: DIET CARDIAC Mechanical Soft       Moniter blood pressure twice daily and keep a log and take to follow up appointment     Stroke: Care Instructions  Your Care Instructions    You have had a stroke. This means that the blood flow to a part of your brain was blocked for some time, which damages the nerve cells in that part of the brain. The part of your body controlled by that part of your brain may not function properly now. The brain is an amazing organ that can heal itself to some degree. The stroke you had damaged part of your brain. But other parts of your brain may take over in some way for the damaged areas. You have already started this process. Your doctor will talk with you about what you can do to prevent another stroke. High blood pressure, high cholesterol, and diabetes are all risk factors for stroke. If you have any of these conditions, work with your doctor to make sure they are under control. Other risk factors for stroke include being overweight, smoking, and not getting regular exercise. Going home may be hard for you and your family. The more you can try to do for yourself, the better. Remember to take each day one at a time. Follow-up care is a key part of your treatment and safety. Be sure to make and go to all appointments, and call your doctor if you are having problems. It's also a good idea to know your test results and keep a list of the medicines you take. How can you care for yourself at home?    · Enter a stroke rehabilitation (rehab) program, if your doctor recommends it. Physical, speech, and occupational therapies can help you manage bathing, dressing, eating, and other basics of daily living.     · Do not drive until your doctor says it is okay.     · It is normal to feel sad or depressed after a stroke.  If these feelings last, talk to your doctor.     · If you are having problems with urine leakage, go to the bathroom at regular times, including when you first wake up and at bedtime. Also, limit fluids after dinner.     · If you are constipated, drink plenty of fluids, enough so that your urine is light yellow or clear like water. If you have kidney, heart, or liver disease and have to limit fluids, talk with your doctor before you increase the amount of fluids you drink. Set up a regular time for using the toilet. If you continue to have constipation, your doctor may suggest using a bulking agent, such as Metamucil, or a stool softener, laxative, or enema. Medicines    · Take your medicines exactly as prescribed. Call your doctor if you think you are having a problem with your medicine. You may be taking several medicines. ACE (angiotensin-converting enzyme) inhibitors, angiotensin II receptor blockers (ARBs), beta-blockers, diuretics (water pills), and calcium channel blockers control your blood pressure. Statins help lower cholesterol. Your doctor may also prescribe medicines for depression, pain, sleep problems, anxiety, or agitation.     · If your doctor has given you a blood thinner to prevent another stroke, be sure you get instructions about how to take your medicine safely. Blood thinners can cause serious bleeding problems.     · Do not take any over-the-counter medicines or herbal products without talking to your doctor first.     · If you take birth control pills or hormone therapy, talk to your doctor about whether they are right for you.    For family members and caregivers    · Make the home safe. Set up a room so that your loved one does not have to climb stairs. Be sure the bathroom is on the same floor. Move throw rugs and furniture that could cause falls. Make sure that the lighting is good. Put grab bars and seats in tubs and showers.     · Find out what your loved one can do and what he or she needs help with. Try not to do things for your loved one that your loved one can do on his or her own.  Help him or her learn and practice new skills.     · Visit and talk with your loved one often. Try doing activities together that you both enjoy, such as playing cards or board games. Keep in touch with your loved one's friends as much as you can. Encourage them to visit.     · Take care of yourself. Do not try to do everything yourself. Ask other family members to help. Eat well, get enough rest, and take time to do things that you enjoy. Keep up with your own doctor visits, and make sure to take your medicines regularly. Get out of the house as much as you can. Join a local support group. Find out if you qualify for home health care visits to help with rehab or for adult day care. When should you call for help? Call 911 anytime you think you may need emergency care. For example, call if:    · You have signs of another stroke. These may include:  ? Sudden numbness, tingling, weakness, or loss of movement in your face, arm, or leg, especially on only one side of your body. ? Sudden vision changes. ? Sudden trouble speaking. ? Sudden confusion or trouble understanding simple statements. ? Sudden problems with walking or balance. ? A sudden, severe headache that is different from past headaches. Call 911 even if these symptoms go away in a few minutes.    Call your doctor now or seek immediate medical care if:    · You have new symptoms that may be related to your stroke, such as falls or trouble swallowing.    Watch closely for changes in your health, and be sure to contact your doctor if you have any problems. Where can you learn more? Go to http://enrique-anusha.info/. Enter F664 in the search box to learn more about \"Stroke: Care Instructions. \"  Current as of: November 21, 2017  Content Version: 11.8  © 7638-9712 [employer]. Care instructions adapted under license by [employer] (which disclaims liability or warranty for this information).  If you have questions about a medical condition or this instruction, always ask your healthcare professional. [address] any warranty or liability for your use of this information. Preventing Falls: Care Instructions  Your Care Instructions    Getting around your home safely can be a challenge if you have injuries or health problems that make it easy for you to fall. Loose rugs and furniture in walkways are among the dangers for many older people who have problems walking or who have poor eyesight. People who have conditions such as arthritis, osteoporosis, or dementia also have to be careful not to fall. You can make your home safer with a few simple measures. Follow-up care is a key part of your treatment and safety. Be sure to make and go to all appointments, and call your doctor if you are having problems. It's also a good idea to know your test results and keep a list of the medicines you take. How can you care for yourself at home? Taking care of yourself  · You may get dizzy if you do not drink enough water. To prevent dehydration, drink plenty of fluids, enough so that your urine is light yellow or clear like water. Choose water and other caffeine-free clear liquids. If you have kidney, heart, or liver disease and have to limit fluids, talk with your doctor before you increase the amount of fluids you drink. · Exercise regularly to improve your strength, muscle tone, and balance. Walk if you can. Swimming may be a good choice if you cannot walk easily. · Have your vision and hearing checked each year or any time you notice a change. If you have trouble seeing and hearing, you might not be able to avoid objects and could lose your balance. · Know the side effects of the medicines you take. Ask your doctor or pharmacist whether the medicines you take can affect your balance. Sleeping pills or sedatives can affect your balance. · Limit the amount of alcohol you drink.  Alcohol can impair your balance and other senses. · Ask your doctor whether calluses or corns on your feet need to be removed. If you wear loose-fitting shoes because of calluses or corns, you can lose your balance and fall. · Talk to your doctor if you have numbness in your feet. Preventing falls at home  · Remove raised doorway thresholds, throw rugs, and clutter. Repair loose carpet or raised areas in the floor. · Move furniture and electrical cords to keep them out of walking paths. · Use nonskid floor wax, and wipe up spills right away, especially on ceramic tile floors. · If you use a walker or cane, put rubber tips on it. If you use crutches, clean the bottoms of them regularly with an abrasive pad, such as steel wool. · Keep your house well lit, especially Pasquale Misbah, and outside walkways. Use night-lights in areas such as hallways and bathrooms. Add extra light switches or use remote switches (such as switches that go on or off when you clap your hands) to make it easier to turn lights on if you have to get up during the night. · Install sturdy handrails on stairways. · Move items in your cabinets so that the things you use a lot are on the lower shelves (about waist level). · Keep a cordless phone and a flashlight with new batteries by your bed. If possible, put a phone in each of the main rooms of your house, or carry a cell phone in case you fall and cannot reach a phone. Or, you can wear a device around your neck or wrist. You push a button that sends a signal for help. · Wear low-heeled shoes that fit well and give your feet good support. Use footwear with nonskid soles. Check the heels and soles of your shoes for wear. Repair or replace worn heels or soles. · Do not wear socks without shoes on wood floors. · Walk on the grass when the sidewalks are slippery. If you live in an area that gets snow and ice in the winter, sprinkle salt on slippery steps and sidewalks.   Preventing falls in the bath  · Install grab bars and nonskid mats inside and outside your shower or tub and near the toilet and sinks. · Use shower chairs and bath benches. · Use a hand-held shower head that will allow you to sit while showering. · Get into a tub or shower by putting the weaker leg in first. Get out of a tub or shower with your strong side first.  · Repair loose toilet seats and consider installing a raised toilet seat to make getting on and off the toilet easier. · Keep your bathroom door unlocked while you are in the shower. Where can you learn more? Go to http://enriqueDigiSat Technologyanusha.info/. Enter 0476 79 [id number] in the search box to learn more about \"Preventing Falls: Care Instructions. \"  Current as of: March 16, 2018  Content Version: 11.8  © 4950-5663 [employer] Care instructions adapted under license by [employer] (which disclaims liability or warranty for this information). If you have questions about a medical condition or this instruction, always ask your healthcare professional. [place] [address] any warranty or liability for your use of this information. Stopping Smoking: Care Instructions  Your Care Instructions  Cigarette smokers crave the nicotine in cigarettes. Giving it up is much harder than simply changing a habit. Your body has to stop craving the nicotine. It is hard to quit, but you can do it. There are many tools that people use to quit smoking. You may find that combining tools works best for you. There are several steps to quitting. First you get ready to quit. Then you get support to help you. After that, you learn new skills and behaviors to become a nonsmoker. For many people, a necessary step is getting and using medicine. Your doctor will help you set up the plan that best meets your needs. You may want to attend a smoking cessation program to help you quit smoking. When you choose a program, look for one that has proven success. Ask your doctor for ideas.  You will greatly increase your chances of success if you take medicine as well as get counseling or join a cessation program.  Some of the changes you feel when you first quit tobacco are uncomfortable. Your body will miss the nicotine at first, and you may feel short-tempered and grumpy. You may have trouble sleeping or concentrating. Medicine can help you deal with these symptoms. You may struggle with changing your smoking habits and rituals. The last step is the tricky one: Be prepared for the smoking urge to continue for a time. This is a lot to deal with, but keep at it. You will feel better. Follow-up care is a key part of your treatment and safety. Be sure to make and go to all appointments, and call your doctor if you are having problems. It's also a good idea to know your test results and keep a list of the medicines you take. How can you care for yourself at home? · Ask your family, friends, and coworkers for support. You have a better chance of quitting if you have help and support. · Join a support group, such as Nicotine Anonymous, for people who are trying to quit smoking. · Consider signing up for a smoking cessation program, such as the American Lung Association's Freedom from Smoking program.  · Get text messaging support. Go to the website at www.smokefree. gov to sign up for the [facility] program.  · Set a quit date. Pick your date carefully so that it is not right in the middle of a big deadline or stressful time. Once you quit, do not even take a puff. Get rid of all ashtrays and lighters after your last cigarette. Clean your house and your clothes so that they do not smell of smoke. · Learn how to be a nonsmoker. Think about ways you can avoid those things that make you reach for a cigarette. ? Avoid situations that put you at greatest risk for smoking. For some people, it is hard to have a drink with friends without smoking.  For others, they might skip a coffee break with coworkers who smoke.  ? Change your daily routine. Take a different route to work or eat a meal in a different place. · Cut down on stress. Calm yourself or release tension by doing an activity you enjoy, such as reading a book, taking a hot bath, or gardening. · Talk to your doctor or pharmacist about nicotine replacement therapy, which replaces the nicotine in your body. You still get nicotine but you do not use tobacco. Nicotine replacement products help you slowly reduce the amount of nicotine you need. These products come in several forms, many of them available over-the-counter:  ? Nicotine patches  ? Nicotine gum and lozenges  ? Nicotine inhaler  · Ask your doctor about bupropion (Wellbutrin) or varenicline (Chantix), which are prescription medicines. They do not contain nicotine. They help you by reducing withdrawal symptoms, such as stress and anxiety. · Some people find hypnosis, acupuncture, and massage helpful for ending the smoking habit. · Eat a healthy diet and get regular exercise. Having healthy habits will help your body move past its craving for nicotine. · Be prepared to keep trying. Most people are not successful the first few times they try to quit. Do not get mad at yourself if you smoke again. Make a list of things you learned and think about when you want to try again, such as next week, next month, or next year. Where can you learn more? Go to http://enrique-anusha.info/. Enter M135 in the search box to learn more about \"Stopping Smoking: Care Instructions. \"  Current as of: November 29, 2017  Content Version: 11.8  © 7609-1523 [email or web address]. Care instructions adapted under license by [employer] (which disclaims liability or warranty for this information).  If you have questions about a medical condition or this instruction, always ask your healthcare professional. [place] [address] any warranty or liability for your use of this information. DISCHARGE SUMMARY from Nurse    PATIENT INSTRUCTIONS:    After general anesthesia or intravenous sedation, for 24 hours or while taking prescription Narcotics:  · Limit your activities  · Do not drive and operate hazardous machinery  · Do not make important personal or business decisions  · Do  not drink alcoholic beverages  · If you have not urinated within 8 hours after discharge, please contact your surgeon on call. Report the following to your surgeon:  · Excessive pain, swelling, redness or odor of or around the surgical area  · Temperature over 100.5  · Nausea and vomiting lasting longer than 4 hours or if unable to take medications  · Any signs of decreased circulation or nerve impairment to extremity: change in color, persistent  numbness, tingling, coldness or increase pain  · Any questions    What to do at Home:  Recommended activity: Activity as tolerated,     If you experience any of the following symptoms see dischar geinstrtuctions, please follow up with primary care. *  Please give a list of your current medications to your Primary Care Provider. *  Please update this list whenever your medications are discontinued, doses are      changed, or new medications (including over-the-counter products) are added. *  Please carry medication information at all times in case of emergency situations. These are general instructions for a healthy lifestyle:    No smoking/ No tobacco products/ Avoid exposure to second hand smoke  Surgeon General's Warning:  Quitting smoking now greatly reduces serious risk to your health.     Obesity, smoking, and sedentary lifestyle greatly increases your risk for illness    A healthy diet, regular physical exercise & weight monitoring are important for maintaining a healthy lifestyle    You may be retaining fluid if you have a history of heart failure or if you experience any of the following symptoms:  Weight gain of 3 pounds or more overnight or 5 pounds in a week, increased swelling in our hands or feet or shortness of breath while lying flat in bed. Please call your doctor as soon as you notice any of these symptoms; do not wait until your next office visit. Recognize signs and symptoms of STROKE:    F-face looks uneven    A-arms unable to move or move unevenly    S-speech slurred or non-existent    T-time-call 911 as soon as signs and symptoms begin-DO NOT go       Back to bed or wait to see if you get better-TIME IS BRAIN. Warning Signs of HEART ATTACK     Call 911 if you have these symptoms:   Chest discomfort. Most heart attacks involve discomfort in the center of the chest that lasts more than a few minutes, or that goes away and comes back. It can feel like uncomfortable pressure, squeezing, fullness, or pain.  Discomfort in other areas of the upper body. Symptoms can include pain or discomfort in one or both arms, the back, neck, jaw, or stomach.  Shortness of breath with or without chest discomfort.  Other signs may include breaking out in a cold sweat, nausea, or lightheadedness. Don't wait more than five minutes to call 911 - MINUTES MATTER! Fast action can save your life. Calling 911 is almost always the fastest way to get lifesaving treatment. Emergency Medical Services staff can begin treatment when they arrive -- up to an hour sooner than if someone gets to the hospital by car. The discharge information has been reviewed with the patient. The patient verbalized understanding. Discharge medications reviewed with the patient and appropriate educational materials and side effects teaching were provided.   ___________________________________________________________________________________________________________________________________

## 2019-07-05 NOTE — PROGRESS NOTES
Hospitalist Progress Note Subjective:  
Daily Progress Note: 11/18/2018 2:26 PM 
 
Patient with baseline learning disorder presented to ER 11/15 with difficulty walking for the prior 2.5 days with right side weakness without any additional symptoms. Several falls on day prior to presentation. Leukopenia with admission WBC: 3.7 and Na+: 131. MRI done in ER with small left parietal infarct. 11/16:  [facility] referral.  Speech now slurred, SLP in with right side lingual deviation noted. Downgrade to mechanical soft and thin liquids. OT, PT evals, needs maximal assist, right upper extremity severely impaired at present, patient is right hand dominant. Some sensation intact. Dr Gerda Agrawal MD in for eval.  Carotid U/S as noted below. Begun on ASA and statin.   
11/17:  Able to wiggle right fingers very slightly. Continues to be unable to move right arm and leg. Speech remains slurred, but able to understand. Supportive niece at bedside. Blood pressure up to 181/115. May need to increase po antihypertensives tomorrow. 11/18:  Norvasc dose  Increased to 10 mg. Attempting to participate in PT. Still unable to bear any weight on right leg. Frustrated, but remains with positive attitude. Niece at bedside. CM in, attempting to plan for discharge. May not have rehab benefits.   
  
ADDITIONAL HISTORY:  HTN, tobacco abuse Current Facility-Administered Medications Medication Dose Route Frequency  albuterol (PROVENTIL VENTOLIN) nebulizer solution 2.5 mg  2.5 mg Nebulization Q6HWA  
 [START ON 11/19/2018] amLODIPine (NORVASC) tablet 10 mg  10 mg Oral DAILY  amLODIPine (NORVASC) tablet 5 mg  5 mg Oral NOW  metoprolol tartrate (LOPRESSOR) tablet 12.5 mg  12.5 mg Oral Q12H  
 metoprolol tartrate (LOPRESSOR) tablet 12.5 mg  12.5 mg Oral ONCE  
 LORazepam (ATIVAN) tablet 0.5 mg  0.5 mg Oral Q6H PRN  
 guaiFENesin ER (MUCINEX) tablet 600 mg  600 mg Oral Q12H  cyclobenzaprine (FLEXERIL) tablet 10 mg  10 mg Oral TID PRN  
 influenza vaccine 2018- (6 mos+)(PF) (FLUARIX QUAD/FLULAVAL QUAD) injection 0.5 mL  0.5 mL IntraMUSCular PRIOR TO DISCHARGE  sodium chloride (NS) flush 5-10 mL  5-10 mL IntraVENous Q8H  
 sodium chloride (NS) flush 5-10 mL  5-10 mL IntraVENous PRN  
 ondansetron (ZOFRAN) injection 4 mg  4 mg IntraVENous Q6H PRN  
 aspirin chewable tablet 81 mg  81 mg Oral DAILY  atorvastatin (LIPITOR) tablet 40 mg  40 mg Oral QHS  acetaminophen (TYLENOL) tablet 650 mg  650 mg Oral Q6H PRN  
 enoxaparin (LOVENOX) injection 40 mg  40 mg SubCUTAneous Q24H  
 nicotine (NICODERM CQ) 21 mg/24 hr patch 1 Patch  1 Patch TransDERmal DAILY  hydrALAZINE (APRESOLINE) 20 mg/mL injection 10 mg  10 mg IntraVENous Q6H PRN  
 0.9% sodium chloride infusion  75 mL/hr IntraVENous CONTINUOUS Review of Systems A comprehensive review of systems was negative except for that written in the HPI. Objective:  
 
Visit Vitals /87 (BP 1 Location: Right arm, BP Patient Position: At rest) Pulse (!) 120 Temp 98 °F (36.7 °C) Resp 19 SpO2 97% Breastfeeding? No  
   O2 Device: Room air Temp (24hrs), Av.2 °F (36.8 °C), Min:98 °F (36.7 °C), Max:98.6 °F (37 °C) 
 
 
701 - 1900 In: 240 [P.O.:240] Out: -  
1901 -  0700 In: 120 [P.O.:120] Out: - General appearance: Oriented and alert, cooperative, working with PT. Complains of back pain, unable to get comfortable, new bed provided with concern for high risk for skin breakdown. Niece at bedside. Head: Normocephalic, without obvious abnormality, atraumatic Eyes: conjunctivae/corneas clear. PERRL Neck: supple, symmetrical, trachea midline, no carotid bruit and no JVD Lungs: Loose cough with small amounts thick sputum, scattered wheezes, otherwise clear to auscultation bilaterally. Aerosols. Heart: regular rate and rhythm, S1, S2 normal, no murmur, click, rub or gallop Abdomen: soft, non-tender. Bowel sounds normal. No masses,  no organomegaly Extremities: right arm and leg with very faint motion of digits. Unable to . Feeling some sensation. Left side extremities normal, atraumatic, no cyanosis or edema Skin: Skin color, texture, turgor normal. No rashes or lesions Neurologic: Left side grossly normal.  Right as above.  
  
Additional comments: Notes,orders, test results, vitals reviewed Data Review Recent Results (from the past 24 hour(s)) CBC WITH AUTOMATED DIFF Collection Time: 11/18/18  5:27 AM  
Result Value Ref Range WBC 4.7 4.3 - 11.1 K/uL  
 RBC 3.98 (L) 4.05 - 5.2 M/uL  
 HGB 12.1 11.7 - 15.4 g/dL HCT 36.2 35.8 - 46.3 % MCV 91.0 79.6 - 97.8 FL  
 MCH 30.4 26.1 - 32.9 PG  
 MCHC 33.4 31.4 - 35.0 g/dL  
 RDW 13.7 % PLATELET 674 428 - 202 K/uL MPV 9.1 (L) 9.4 - 12.3 FL ABSOLUTE NRBC 0.00 0.0 - 0.2 K/uL  
 DF AUTOMATED NEUTROPHILS 58 43 - 78 % LYMPHOCYTES 23 13 - 44 % MONOCYTES 13 (H) 4.0 - 12.0 % EOSINOPHILS 5 0.5 - 7.8 % BASOPHILS 1 0.0 - 2.0 % IMMATURE GRANULOCYTES 0 0.0 - 5.0 %  
 ABS. NEUTROPHILS 2.7 1.7 - 8.2 K/UL  
 ABS. LYMPHOCYTES 1.1 0.5 - 4.6 K/UL  
 ABS. MONOCYTES 0.6 0.1 - 1.3 K/UL  
 ABS. EOSINOPHILS 0.2 0.0 - 0.8 K/UL  
 ABS. BASOPHILS 0.0 0.0 - 0.2 K/UL  
 ABS. IMM. GRANS. 0.0 0.0 - 0.5 K/UL METABOLIC PANEL, BASIC Collection Time: 11/18/18  5:27 AM  
Result Value Ref Range Sodium 135 (L) 136 - 145 mmol/L Potassium 3.4 (L) 3.5 - 5.1 mmol/L Chloride 104 98 - 107 mmol/L  
 CO2 24 21 - 32 mmol/L Anion gap 7 7 - 16 mmol/L Glucose 102 (H) 65 - 100 mg/dL BUN 8 8 - 23 MG/DL Creatinine 0.65 0.6 - 1.0 MG/DL  
 GFR est AA >60 >60 ml/min/1.73m2 GFR est non-AA >60 >60 ml/min/1.73m2 Calcium 8.1 (L) 8.3 - 10.4 MG/DL MAGNESIUM Collection Time: 11/18/18  5:27 AM  
Result Value Ref Range Magnesium 2.0 1.8 - 2.4 mg/dL CT HEAD: 11/15: No acute intraparenchymal hemorrhage or abnormal extra-axial fluid collection.  The ventricles are normal size. Fairly extensive white matter low attenuation is present, especially frontal lobe nonspecific, likely chronic small vessel disease.  Old small pontine infarct on the right. No midline shift 
mass effect.  Included portion of the paranasal sinuses and orbits grossly unremarkable. IMPRESSION:  Negative for acute intracranial hemorrhage. Chronic changes. MRI is more sensitive for acute infarct. 
  
MRI BRAIN:  11/15: There is a small acute lacunar infarcts in the left corona radiata.  There is no associated hemorrhage.  No other areas of acute infarction are seen. Hugh Whittaker is an old lacunar infarct in the right midbrain.  There is moderate chronic change in the cerebellum and periventricular white matter. .  The ventricles are normal in size.  There are no extra-axial fluid collections.  There are no intracranial masses. [place] Whittaker are no bony lesions.  The sinuses are clear. IMPRESSION: Small acute left parietal white matter infarct.  No evidence of hemorrhage. 
  
CXR: 11/15: The lungs are clear. There are no infiltrates or effusions.  The heart size is normal.  The aorta is slightly prominent and atherosclerotic.   
IMPRESSION: No acute findings in the chest 
  
CAROTID DOPPLERS: 11/15: There is significant calcified plaque in the right carotid bulb, but no significant stenosis based on flow velocities.   There is normal antegrade flow 
in the right vertebral artery. There is also significant calcified plaque in the left carotid bulb and proximal left internal carotid artery.  Again, there is no significant increase in flow velocity. There is normal antegrade flow in the left vertebral artery. IMPRESSION: Extensive atherosclerosis in the carotid arteries bilaterally.  No utrasound evidence of significant carotid stenosis.  
  
Assessment/Plan:  
Acute left side CVA with right side paralysis Stroke protocols Continue PT/OT Hope for rehab on discharge Hypertensive urgency Permissive hypertension Prn hydralazine 
            norvasc 5 mg added 11/16, increased to 10 mg            (home dose) on 11/17 Begin lopressor 12.5 q 12 hours Longstanding, ongoing Tobacco use Nicotine patch Hyponatremia: resolving with IVF Leukopenia of unclear etiology. monitor Cough Tessalon prn Aerosols 
  
Care Plan discussed with: Patient, niece, and Nurse 
  
Signed By: Tai Atwood NP November 18, 2018 Xerosis Aggressive Treatment: I recommended application of Cetaphil or CeraVe numerous times a day going to bed to all dry areas. I also prescribed a topical steroid for twice daily use.

## 2019-09-12 ENCOUNTER — HOSPITAL ENCOUNTER (OUTPATIENT)
Dept: INFUSION THERAPY | Age: 64
Discharge: HOME | End: 2019-09-12
Payer: MEDICAID

## 2019-09-12 VITALS
DIASTOLIC BLOOD PRESSURE: 72 MMHG | OXYGEN SATURATION: 99 % | HEART RATE: 110 BPM | RESPIRATION RATE: 20 BRPM | TEMPERATURE: 99 F | SYSTOLIC BLOOD PRESSURE: 122 MMHG

## 2019-09-12 PROCEDURE — 96374 THER/PROPH/DIAG INJ IV PUSH: CPT

## 2019-09-12 PROCEDURE — 74011000258 HC RX REV CODE- 258: Performed by: THORACIC SURGERY (CARDIOTHORACIC VASCULAR SURGERY)

## 2019-09-12 PROCEDURE — 74011000250 HC RX REV CODE- 250: Performed by: THORACIC SURGERY (CARDIOTHORACIC VASCULAR SURGERY)

## 2019-09-12 PROCEDURE — 77030018667 ADMN ST IV BLD FENW -A

## 2019-09-12 PROCEDURE — 74011250636 HC RX REV CODE- 250/636: Performed by: THORACIC SURGERY (CARDIOTHORACIC VASCULAR SURGERY)

## 2019-09-12 PROCEDURE — 36430 TRANSFUSION BLD/BLD COMPNT: CPT

## 2019-09-12 PROCEDURE — 86923 COMPATIBILITY TEST ELECTRIC: CPT

## 2019-09-12 PROCEDURE — P9016 RBC LEUKOCYTES REDUCED: HCPCS

## 2019-09-12 PROCEDURE — 86900 BLOOD TYPING SEROLOGIC ABO: CPT

## 2019-09-12 RX ORDER — PANTOPRAZOLE SODIUM 40 MG/1
40 TABLET, DELAYED RELEASE ORAL DAILY
COMMUNITY

## 2019-09-12 RX ORDER — CETIRIZINE HCL 10 MG
TABLET ORAL
COMMUNITY

## 2019-09-12 RX ORDER — ALBUTEROL SULFATE 0.83 MG/ML
2.5 SOLUTION RESPIRATORY (INHALATION) AS NEEDED
Status: DISPENSED | OUTPATIENT
Start: 2019-09-12 | End: 2019-09-12

## 2019-09-12 RX ORDER — ONDANSETRON 4 MG/1
4 TABLET, FILM COATED ORAL
COMMUNITY

## 2019-09-12 RX ORDER — ONDANSETRON 2 MG/ML
8 INJECTION INTRAMUSCULAR; INTRAVENOUS AS NEEDED
Status: ACTIVE | OUTPATIENT
Start: 2019-09-12 | End: 2019-09-12

## 2019-09-12 RX ORDER — DOCUSATE SODIUM 100 MG/1
100 CAPSULE, LIQUID FILLED ORAL
COMMUNITY

## 2019-09-12 RX ORDER — DIPHENHYDRAMINE HYDROCHLORIDE 50 MG/ML
50 INJECTION, SOLUTION INTRAMUSCULAR; INTRAVENOUS AS NEEDED
Status: DISPENSED | OUTPATIENT
Start: 2019-09-12 | End: 2019-09-12

## 2019-09-12 RX ORDER — ALBUTEROL SULFATE 2.5 MG/.5ML
2.5 SOLUTION RESPIRATORY (INHALATION) ONCE
COMMUNITY

## 2019-09-12 RX ORDER — ACETAMINOPHEN 325 MG/1
650 TABLET ORAL AS NEEDED
Status: ACTIVE | OUTPATIENT
Start: 2019-09-12 | End: 2019-09-12

## 2019-09-12 RX ORDER — BENZONATATE 100 MG/1
100 CAPSULE ORAL
COMMUNITY

## 2019-09-12 RX ORDER — ERGOCALCIFEROL 1.25 MG/1
50000 CAPSULE ORAL
COMMUNITY

## 2019-09-12 RX ORDER — CHOLECALCIFEROL (VITAMIN D3) 125 MCG
CAPSULE ORAL
COMMUNITY

## 2019-09-12 RX ORDER — LIDOCAINE 40 MG/G
CREAM TOPICAL
COMMUNITY

## 2019-09-12 RX ORDER — CYCLOBENZAPRINE HCL 5 MG
5 TABLET ORAL 2 TIMES DAILY
COMMUNITY

## 2019-09-12 RX ORDER — SODIUM CHLORIDE 9 MG/ML
250 INJECTION, SOLUTION INTRAVENOUS AS NEEDED
Status: DISCONTINUED | OUTPATIENT
Start: 2019-09-12 | End: 2019-09-12

## 2019-09-12 RX ORDER — SODIUM CHLORIDE 9 MG/ML
25 INJECTION, SOLUTION INTRAVENOUS AS NEEDED
Status: DISCONTINUED | OUTPATIENT
Start: 2019-09-12 | End: 2019-09-16 | Stop reason: HOSPADM

## 2019-09-12 RX ORDER — HYDROCHLOROTHIAZIDE 12.5 MG/1
12.5 TABLET ORAL DAILY
COMMUNITY

## 2019-09-12 RX ORDER — EPINEPHRINE 1 MG/ML
0.3 INJECTION, SOLUTION, CONCENTRATE INTRAVENOUS AS NEEDED
Status: ACTIVE | OUTPATIENT
Start: 2019-09-12 | End: 2019-09-12

## 2019-09-12 RX ORDER — HYDROCORTISONE SODIUM SUCCINATE 100 MG/2ML
100 INJECTION, POWDER, FOR SOLUTION INTRAMUSCULAR; INTRAVENOUS AS NEEDED
Status: ACTIVE | OUTPATIENT
Start: 2019-09-12 | End: 2019-09-12

## 2019-09-12 RX ORDER — SENNOSIDES 8.6 MG/1
1 TABLET ORAL DAILY
COMMUNITY

## 2019-09-12 RX ORDER — SERTRALINE HYDROCHLORIDE 100 MG/1
TABLET, FILM COATED ORAL DAILY
COMMUNITY

## 2019-09-12 RX ADMIN — ALBUTEROL SULFATE 2.5 MG: 2.5 SOLUTION RESPIRATORY (INHALATION) at 12:38

## 2019-09-12 RX ADMIN — SODIUM CHLORIDE 25 ML/HR: 900 INJECTION, SOLUTION INTRAVENOUS at 11:50

## 2019-09-12 RX ADMIN — IRON SUCROSE 200 MG: 20 INJECTION, SOLUTION INTRAVENOUS at 10:05

## 2019-09-12 NOTE — PROGRESS NOTES
Arrived to infusion accompanied by caregiver from rehab facility. Patient is alert/oriented but cannot transfer or stand independently. TXM drawn. Venofer infused, tolerated well. No sign of reaction. Transfusing prbc. Patient began having exp. Wheezing, labored  Breathing. O2 sats stable,and bp,pulse. Transfusion paused  Albuterol tx administered. Breathing gradually returned to baseline. Notified Ila Lubin NP.   NP gave instructions to run remainder of blood slowly,may repeat albuterol x1. Will hold 2nd unit of blood and give tomorrow. Patient to be sent to ER if respirations become labored after second albuterol tx. Remainder of blood infused slowly at 100ml/hr. Tolerated well  Discharged to facility by [facility] and caregiver via wheelchair.   Next appt 9/13

## 2019-09-13 ENCOUNTER — HOSPITAL ENCOUNTER (OUTPATIENT)
Dept: INFUSION THERAPY | Age: 64
Discharge: HOME | End: 2019-09-13
Payer: MEDICAID

## 2019-09-13 VITALS
TEMPERATURE: 98.2 F | SYSTOLIC BLOOD PRESSURE: 138 MMHG | OXYGEN SATURATION: 100 % | DIASTOLIC BLOOD PRESSURE: 86 MMHG | RESPIRATION RATE: 18 BRPM | HEART RATE: 99 BPM

## 2019-09-13 PROCEDURE — 74011250636 HC RX REV CODE- 250/636: Performed by: THORACIC SURGERY (CARDIOTHORACIC VASCULAR SURGERY)

## 2019-09-13 PROCEDURE — P9016 RBC LEUKOCYTES REDUCED: HCPCS

## 2019-09-13 PROCEDURE — 74011000250 HC RX REV CODE- 250

## 2019-09-13 PROCEDURE — 77030018667 ADMN ST IV BLD FENW -A

## 2019-09-13 PROCEDURE — 36430 TRANSFUSION BLD/BLD COMPNT: CPT

## 2019-09-13 RX ORDER — ALBUTEROL SULFATE 0.83 MG/ML
2.5 SOLUTION RESPIRATORY (INHALATION) AS NEEDED
Status: DISPENSED | OUTPATIENT
Start: 2019-09-13 | End: 2019-09-13

## 2019-09-13 RX ORDER — SODIUM CHLORIDE 9 MG/ML
250 INJECTION, SOLUTION INTRAVENOUS AS NEEDED
Status: DISCONTINUED | OUTPATIENT
Start: 2019-09-13 | End: 2019-09-13

## 2019-09-13 RX ORDER — DIPHENHYDRAMINE HYDROCHLORIDE 50 MG/ML
50 INJECTION, SOLUTION INTRAMUSCULAR; INTRAVENOUS AS NEEDED
Status: ACTIVE | OUTPATIENT
Start: 2019-09-13 | End: 2019-09-13

## 2019-09-13 RX ORDER — EPINEPHRINE 1 MG/ML
0.3 INJECTION, SOLUTION, CONCENTRATE INTRAVENOUS AS NEEDED
Status: ACTIVE | OUTPATIENT
Start: 2019-09-13 | End: 2019-09-13

## 2019-09-13 RX ORDER — ACETAMINOPHEN 325 MG/1
650 TABLET ORAL AS NEEDED
Status: ACTIVE | OUTPATIENT
Start: 2019-09-13 | End: 2019-09-13

## 2019-09-13 RX ORDER — SODIUM CHLORIDE 9 MG/ML
250 INJECTION, SOLUTION INTRAVENOUS AS NEEDED
Status: DISCONTINUED | OUTPATIENT
Start: 2019-09-13 | End: 2019-09-17 | Stop reason: HOSPADM

## 2019-09-13 RX ORDER — ONDANSETRON 2 MG/ML
8 INJECTION INTRAMUSCULAR; INTRAVENOUS AS NEEDED
Status: ACTIVE | OUTPATIENT
Start: 2019-09-13 | End: 2019-09-13

## 2019-09-13 RX ORDER — HYDROCORTISONE SODIUM SUCCINATE 100 MG/2ML
100 INJECTION, POWDER, FOR SOLUTION INTRAMUSCULAR; INTRAVENOUS AS NEEDED
Status: ACTIVE | OUTPATIENT
Start: 2019-09-13 | End: 2019-09-13

## 2019-09-13 RX ADMIN — ALBUTEROL SULFATE 2.5 MG: 2.5 SOLUTION RESPIRATORY (INHALATION) at 10:38

## 2019-09-13 RX ADMIN — SODIUM CHLORIDE 250 ML: 900 INJECTION, SOLUTION INTRAVENOUS at 08:00

## 2019-09-13 NOTE — PROGRESS NOTES
Arrived to infusion via w/c accompanied by caregiver. 1 unit prbc infused very slowly, 1 episode of sob and wheezing. Albuterol tx administered. Respirations less labored , rest of prbc infused with no further breathing difficulties. Returned to rehab facility with caregiver via transport.   No further appts at this time

## 2019-09-14 LAB
ABO + RH BLD: NORMAL
BLD PROD TYP BPU: NORMAL
BLD PROD TYP BPU: NORMAL
BLOOD GROUP ANTIBODIES SERPL: NORMAL
BPU ID: NORMAL
BPU ID: NORMAL
CROSSMATCH RESULT,%XM: NORMAL
CROSSMATCH RESULT,%XM: NORMAL
SPECIMEN EXP DATE BLD: NORMAL
STATUS OF UNIT,%ST: NORMAL
STATUS OF UNIT,%ST: NORMAL
UNIT DIVISION, %UDIV: 0
UNIT DIVISION, %UDIV: 0

## 2019-09-20 ENCOUNTER — HOSPITAL ENCOUNTER (OUTPATIENT)
Dept: INFUSION THERAPY | Age: 64
Discharge: HOME | End: 2019-09-20
Payer: MEDICAID

## 2019-09-20 VITALS
TEMPERATURE: 97.8 F | SYSTOLIC BLOOD PRESSURE: 140 MMHG | OXYGEN SATURATION: 99 % | DIASTOLIC BLOOD PRESSURE: 77 MMHG | HEART RATE: 90 BPM | RESPIRATION RATE: 18 BRPM

## 2019-09-20 PROCEDURE — 74011000258 HC RX REV CODE- 258: Performed by: THORACIC SURGERY (CARDIOTHORACIC VASCULAR SURGERY)

## 2019-09-20 PROCEDURE — 74011250636 HC RX REV CODE- 250/636: Performed by: THORACIC SURGERY (CARDIOTHORACIC VASCULAR SURGERY)

## 2019-09-20 PROCEDURE — 96361 HYDRATE IV INFUSION ADD-ON: CPT

## 2019-09-20 PROCEDURE — 74011250636 HC RX REV CODE- 250/636: Performed by: INTERNAL MEDICINE

## 2019-09-20 PROCEDURE — 96374 THER/PROPH/DIAG INJ IV PUSH: CPT

## 2019-09-20 RX ORDER — ALBUTEROL SULFATE 0.83 MG/ML
2.5 SOLUTION RESPIRATORY (INHALATION) AS NEEDED
Status: ACTIVE | OUTPATIENT
Start: 2019-09-20 | End: 2019-09-20

## 2019-09-20 RX ORDER — ONDANSETRON 2 MG/ML
8 INJECTION INTRAMUSCULAR; INTRAVENOUS AS NEEDED
Status: ACTIVE | OUTPATIENT
Start: 2019-09-20 | End: 2019-09-20

## 2019-09-20 RX ORDER — ACETAMINOPHEN 325 MG/1
650 TABLET ORAL AS NEEDED
Status: ACTIVE | OUTPATIENT
Start: 2019-09-20 | End: 2019-09-20

## 2019-09-20 RX ORDER — EPINEPHRINE 1 MG/ML
0.3 INJECTION, SOLUTION, CONCENTRATE INTRAVENOUS AS NEEDED
Status: ACTIVE | OUTPATIENT
Start: 2019-09-20 | End: 2019-09-20

## 2019-09-20 RX ORDER — HYDROCORTISONE SODIUM SUCCINATE 100 MG/2ML
100 INJECTION, POWDER, FOR SOLUTION INTRAMUSCULAR; INTRAVENOUS AS NEEDED
Status: ACTIVE | OUTPATIENT
Start: 2019-09-20 | End: 2019-09-20

## 2019-09-20 RX ORDER — DIPHENHYDRAMINE HYDROCHLORIDE 50 MG/ML
50 INJECTION, SOLUTION INTRAMUSCULAR; INTRAVENOUS AS NEEDED
Status: ACTIVE | OUTPATIENT
Start: 2019-09-20 | End: 2019-09-20

## 2019-09-20 RX ADMIN — IRON SUCROSE 200 MG: 20 INJECTION, SOLUTION INTRAVENOUS at 08:46

## 2019-09-20 RX ADMIN — SODIUM CHLORIDE 500 ML: 900 INJECTION, SOLUTION INTRAVENOUS at 09:01

## 2019-09-20 NOTE — PROGRESS NOTES
Arrived to the [facility]. Assessment and Venofer completed. Patient tolerated well. Any issues or concerns during appointment: No.  Patient aware of next infusion appointment on 9/27/19 at 930am.  Discharged via w/c accompanied by caregiver.

## 2019-09-27 ENCOUNTER — HOSPITAL ENCOUNTER (OUTPATIENT)
Dept: INFUSION THERAPY | Age: 64
Discharge: HOME | End: 2019-09-27
Payer: MEDICAID

## 2019-09-27 VITALS
RESPIRATION RATE: 18 BRPM | DIASTOLIC BLOOD PRESSURE: 75 MMHG | OXYGEN SATURATION: 96 % | TEMPERATURE: 98.2 F | HEART RATE: 81 BPM | SYSTOLIC BLOOD PRESSURE: 112 MMHG

## 2019-09-27 PROCEDURE — 96365 THER/PROPH/DIAG IV INF INIT: CPT

## 2019-09-27 PROCEDURE — 74011250636 HC RX REV CODE- 250/636

## 2019-09-27 PROCEDURE — 74011250636 HC RX REV CODE- 250/636: Performed by: THORACIC SURGERY (CARDIOTHORACIC VASCULAR SURGERY)

## 2019-09-27 PROCEDURE — 74011000258 HC RX REV CODE- 258: Performed by: THORACIC SURGERY (CARDIOTHORACIC VASCULAR SURGERY)

## 2019-09-27 RX ORDER — SODIUM CHLORIDE 0.9 % (FLUSH) 0.9 %
10-40 SYRINGE (ML) INJECTION AS NEEDED
Status: DISCONTINUED | OUTPATIENT
Start: 2019-09-27 | End: 2019-10-01 | Stop reason: HOSPADM

## 2019-09-27 RX ORDER — SODIUM CHLORIDE 9 MG/ML
250 INJECTION, SOLUTION INTRAVENOUS ONCE
Status: COMPLETED | OUTPATIENT
Start: 2019-09-27 | End: 2019-09-27

## 2019-09-27 RX ADMIN — Medication 10 ML: at 09:40

## 2019-09-27 RX ADMIN — SODIUM CHLORIDE 250 ML: 900 INJECTION, SOLUTION INTRAVENOUS at 09:40

## 2019-09-27 RX ADMIN — IRON SUCROSE 200 MG: 20 INJECTION, SOLUTION INTRAVENOUS at 10:09

## 2019-09-27 NOTE — PROGRESS NOTES
Arrived to the [facility]. Assessment completed. Patient tolerated Venofer well. Any issues or concerns during appointment: none. Patient aware of next infusion appointment on 10/4/19 (date) at 56 (time) with IV infusion center. Discharged via [facility], with sitter. Patient instructed to call her doctor's office immediately for any problems or concerns. She and sitter verbalizes understanding. [facility] transportation service will be driving her back to her facility.

## 2019-10-04 ENCOUNTER — HOSPITAL ENCOUNTER (OUTPATIENT)
Dept: INFUSION THERAPY | Age: 64
Discharge: HOME | End: 2019-10-04
Payer: MEDICAID

## 2019-10-04 VITALS
HEART RATE: 75 BPM | TEMPERATURE: 98.2 F | RESPIRATION RATE: 18 BRPM | DIASTOLIC BLOOD PRESSURE: 85 MMHG | OXYGEN SATURATION: 97 % | SYSTOLIC BLOOD PRESSURE: 135 MMHG

## 2019-10-04 PROCEDURE — 74011000258 HC RX REV CODE- 258: Performed by: THORACIC SURGERY (CARDIOTHORACIC VASCULAR SURGERY)

## 2019-10-04 PROCEDURE — 96374 THER/PROPH/DIAG INJ IV PUSH: CPT

## 2019-10-04 PROCEDURE — 74011250636 HC RX REV CODE- 250/636: Performed by: THORACIC SURGERY (CARDIOTHORACIC VASCULAR SURGERY)

## 2019-10-04 RX ORDER — SODIUM CHLORIDE 0.9 % (FLUSH) 0.9 %
10 SYRINGE (ML) INJECTION EVERY 8 HOURS
Status: DISCONTINUED | OUTPATIENT
Start: 2019-10-04 | End: 2019-10-08 | Stop reason: HOSPADM

## 2019-10-04 RX ADMIN — Medication 10 ML: at 11:13

## 2019-10-04 RX ADMIN — IRON SUCROSE 200 MG: 20 INJECTION, SOLUTION INTRAVENOUS at 10:57

## 2019-10-04 NOTE — PROGRESS NOTES
Arrived to the Replaced by [facility]. [place] completed. Patient tolerated without problems. Any issues or concerns during appointment: none. Patient aware of next infusion appointment on 10/11 at 10:30. Discharged in wheelchair to rehab facility with caretaker.

## 2019-10-11 ENCOUNTER — HOSPITAL ENCOUNTER (OUTPATIENT)
Dept: INFUSION THERAPY | Age: 64
Discharge: HOME | End: 2019-10-11
Payer: MEDICAID

## 2019-10-11 PROCEDURE — 96374 THER/PROPH/DIAG INJ IV PUSH: CPT

## 2019-10-11 PROCEDURE — 74011250636 HC RX REV CODE- 250/636: Performed by: INTERNAL MEDICINE

## 2019-10-11 PROCEDURE — 74011250636 HC RX REV CODE- 250/636: Performed by: THORACIC SURGERY (CARDIOTHORACIC VASCULAR SURGERY)

## 2019-10-11 PROCEDURE — 74011000258 HC RX REV CODE- 258: Performed by: THORACIC SURGERY (CARDIOTHORACIC VASCULAR SURGERY)

## 2019-10-11 PROCEDURE — 96361 HYDRATE IV INFUSION ADD-ON: CPT

## 2019-10-11 RX ORDER — ALBUTEROL SULFATE 0.83 MG/ML
2.5 SOLUTION RESPIRATORY (INHALATION) AS NEEDED
Status: ACTIVE | OUTPATIENT
Start: 2019-10-11 | End: 2019-10-11

## 2019-10-11 RX ORDER — EPINEPHRINE 1 MG/ML
0.3 INJECTION, SOLUTION, CONCENTRATE INTRAVENOUS AS NEEDED
Status: ACTIVE | OUTPATIENT
Start: 2019-10-11 | End: 2019-10-11

## 2019-10-11 RX ORDER — ACETAMINOPHEN 325 MG/1
650 TABLET ORAL AS NEEDED
Status: ACTIVE | OUTPATIENT
Start: 2019-10-11 | End: 2019-10-11

## 2019-10-11 RX ORDER — HYDROCORTISONE SODIUM SUCCINATE 100 MG/2ML
100 INJECTION, POWDER, FOR SOLUTION INTRAMUSCULAR; INTRAVENOUS AS NEEDED
Status: ACTIVE | OUTPATIENT
Start: 2019-10-11 | End: 2019-10-11

## 2019-10-11 RX ORDER — ONDANSETRON 2 MG/ML
8 INJECTION INTRAMUSCULAR; INTRAVENOUS AS NEEDED
Status: ACTIVE | OUTPATIENT
Start: 2019-10-11 | End: 2019-10-11

## 2019-10-11 RX ORDER — DIPHENHYDRAMINE HYDROCHLORIDE 50 MG/ML
50 INJECTION, SOLUTION INTRAMUSCULAR; INTRAVENOUS AS NEEDED
Status: ACTIVE | OUTPATIENT
Start: 2019-10-11 | End: 2019-10-11

## 2019-10-11 RX ADMIN — SODIUM CHLORIDE 500 ML: 900 INJECTION, SOLUTION INTRAVENOUS at 11:15

## 2019-10-11 RX ADMIN — IRON SUCROSE 200 MG: 20 INJECTION, SOLUTION INTRAVENOUS at 11:00

## 2019-10-11 NOTE — PROGRESS NOTES
Arrived to the [facility]. Assessment and Venofer completed. Patient tolerated well. Any issues or concerns during appointment: No.  Discharged via w/c accompanied aide.

## 2025-02-24 NOTE — PROGRESS NOTES
Cleaning    Weekly you should wash the water tub and air tubing in warm water using mild detergent like Ivory. Do not wash in  or washing machine.   Rinse the water tub and air tubing thoroughly and allow to dry out of direct sunlight and/or heat.  3.  Wipe the exterior of the device with a dry cloth.  4.  Mask or pillow cushions should be wiped out daily, once weekly with soapy water.  5.  Headgear should be washed weekly and let air dry.    Checking and replacing your parts regularly    It is important for your comfort and health that you check and replace your parts and supplies regularly. Replacing your parts and supplies on a regular basis helps helps ensure you are receiving optimal therapy and continued comfort.    Schedule for supply replacement includes:     Monthly replacement for cushion or pillows and air filters  Every 3 month replacement includes mask frame, air tubing  Every 6 months includes headgear and water tub    Problem: Mobility Impaired (Adult and Pediatric) Goal: *Acute Goals and Plan of Care (Insert Text) STG: 
(1.)Ms. Mercy Calderon will move from supine to sit and sit to supine , scoot up and down and roll side to side with MODERATE ASSIST within 3 treatment day(s). (2.)Ms. Mercy Calderon will transfer from bed to chair and chair to bed with MAXIMAL ASSIST using the least restrictive device within 3 treatment day(s). (3.)Ms. Mercy Calderon will ambulate with MAXIMAL ASSIST for 10 feet with the least restrictive device within 3 treatment day(s). (4.)Ms. Mercy Calderon will maintain static/dynamic sitting x 12 minutes with at least FAIR balance for improved safety within 3 days. LTG: 
(1.)Ms. Mercy Calderon will move from supine to sit and sit to supine , scoot up and down and roll side to side in bed with MINIMAL ASSIST within 7 treatment day(s). (2.)Ms. Mercy Calderon will transfer from bed to chair and chair to bed with MINIMAL ASSIST using the least restrictive device within 7 treatment day(s). (3.)Ms. Mercy Calderon will ambulate with MINIMAL ASSIST for 50+ feet with the least restrictive device within 7 treatment day(s). (4.)Ms. Mercy Calderon will maintain static/dynamic sitting x 12 minutes with at least FAIR + balance for improved safety within 7 days. ________________________________________________________________________________________________ PHYSICAL THERAPY: Daily Note, Treatment Day: 1st, AM 11/19/2018INPATIENT: Hospital Day: 5 Payor: Janelle / Plan: [id number] / Product Type: Managed Care Medicaid /  
  
NAME/AGE/GENDER: Himanshu Akhtar is a 61 y.o. female PRIMARY DIAGNOSIS: CVA (cerebral vascular accident) [facility]) CVA (cerebral vascular accident) ([facility] CVA (cerebral vascular accident) ([facility] ICD-10: Treatment Diagnosis:  
 · Generalized Muscle Weakness (M62.81) · Difficulty in walking, Not elsewhere classified (R26.2) · History of falling (Z91.81) Precaution/Allergies: Patient has no known allergies. ASSESSMENT:  
Ms. Hannah Reynoso is a 61 y.o. female in the hospital for the above who was supine in bed upon arrival. Very agreeable to therapy. Pt reports that she lives in a one story house with a friend that has 5 steps to enter. Pt also reported that PTA she was independent with ADLs and ambulated with independence. Pt admitted to one recent falls in the past year. Bed mobility requires min to moderate assist however patient is trying to assist.  Sitting balance edge of bed fair to good. Sit to stand with moderate assist.  Stand pivot transfer tot he recliner. Patient required assistance to move back in the recliner. Patient instructed in therapeutic exercises for bilateral LE strengthening and ROM. Tolerated well. Making progress as patient is very motivated and cooperative. Patient is left in the recliner with alarm intact and needs within reach. Lunch has arrived. Ms. Hannah Reynoso could benefit from skilled PT as she is currently functioning below her baseline. Additional skilled therapy would be very beneficial at discharge. Will continue PT efforts. . 
 
 
This section established at most recent assessment PROBLEM LIST (Impairments causing functional limitations): 1. Decreased Strength 2. Decreased ADL/Functional Activities 3. Decreased Transfer Abilities 4. Decreased Ambulation Ability/Technique 5. Decreased Balance INTERVENTIONS PLANNED: (Benefits and precautions of physical therapy have been discussed with the patient.) 1. Balance Exercise 2. Bed Mobility 3. Family Education 4. Gait Training 5. Neuromuscular Re-education/Strengthening 6. Therapeutic Activites 7. Therapeutic Exercise/Strengthening 8. Transfer Training TREATMENT PLAN: Frequency/Duration: 3 times a week for duration of hospital stay Rehabilitation Potential For Stated Goals: Good RECOMMENDED REHABILITATION/EQUIPMENT: (at time of discharge pending progress): Due to the probability of continued deficits (see above) this patient will likely need continued skilled physical therapy after discharge. Equipment:  
? None at this time HISTORY:  
History of Present Injury/Illness (Reason for Referral): 
See H&P Past Medical History/Comorbidities: Ms. Nora Casanova  has no past medical history on file. Ms. Nora Casanova  has no past surgical history on file. Social History/Living Environment:  
Home Environment: Private residence # Steps to Enter: 5 One/Two Story Residence: One story Living Alone: No 
Support Systems: Friends \ neighbors Patient Expects to be Discharged to[de-identified] Unknown Current DME Used/Available at Home: Cane, straight Tub or Shower Type: Tub/Shower combination Prior Level of Function/Work/Activity: 
Lives with friend in one story house and PTA pt was independent with ADLs and ambulation. One recent fall reported. Number of Personal Factors/Comorbidities that affect the Plan of Care: 0: LOW COMPLEXITY EXAMINATION:  
Most Recent Physical Functioning:  
Gross Assessment: 
  
         
  
Posture: 
  
Balance: 
Sitting: Impaired Sitting - Static: Good (unsupported) Sitting - Dynamic: Fair (occasional) Standing: Impaired Standing - Static: Poor Standing - Dynamic : Poor Bed Mobility: 
Rolling: Minimum assistance; Moderate assistance Supine to Sit: Moderate assistance Scooting: Moderate assistance Wheelchair Mobility: 
  
Transfers: 
Sit to Stand: Moderate assistance Stand to Sit: Moderate assistance Gait: 
  
   
  
Body Structures Involved: 1. Nerves 2. Muscles Body Functions Affected: 1. Neuromusculoskeletal 
2. Movement Related Activities and Participation Affected: 1. General Tasks and Demands 2. Mobility 3. Self Care 4. Domestic Life 5. Community, Social and [place] Number of elements that affect the Plan of Care: 4+: HIGH COMPLEXITY CLINICAL PRESENTATION:  
 Presentation: Stable and uncomplicated: LOW COMPLEXITY CLINICAL DECISION MAKING:  
[facility] AM-[facility]AC 6 Clicks Basic Mobility Inpatient Short Form How much difficulty does the patient currently have. .. Unable A Lot A Little None 1. Turning over in bed (including adjusting bedclothes, sheets and blankets)? [] 1   [] 2   [x] 3   [] 4  
2. Sitting down on and standing up from a chair with arms ( e.g., wheelchair, bedside commode, etc.)   [] 1   [x] 2   [] 3   [] 4  
3. Moving from lying on back to sitting on the side of the bed? [] 1   [x] 2   [] 3   [] 4 How much help from another person does the patient currently need. .. Total A Lot A Little None 4. Moving to and from a bed to a chair (including a wheelchair)? [] 1   [x] 2   [] 3   [] 4  
5. Need to walk in hospital room? [x] 1   [] 2   [] 3   [] 4  
6. Climbing 3-5 steps with a railing? [x] 1   [] 2   [] 3   [] 4  
© 2007, Trustees of [facility], under license to [employer]. All rights reserved Score:  Initial: 11 Most Recent: X (Date: -- ) Interpretation of Tool:  Represents activities that are increasingly more difficult (i.e. Bed mobility, Transfers, Gait). Score 24 23 22-20 19-15 14-10 9-7 6 Modifier CH CI CJ CK CL CM CN   
 
? Mobility - Walking and Moving Around:  
  - CURRENT STATUS: CL - 60%-79% impaired, limited or restricted  - GOAL STATUS: CK - 40%-59% impaired, limited or restricted  - D/C STATUS:  ---------------To be determined--------------- Payor: Janelle / Plan: [address] [place] [address] Avenue / Product Type: Managed Care Medicaid /   
 
Medical Necessity:    
· Patient demonstrates good rehab potential due to higher previous functional level. Reason for Services/Other Comments: 
· Patient continues to require skilled intervention due to decreased functional mobility and balance.   
Use of outcome tool(s) and clinical judgement create a POC that gives a: Clear prediction of patient's progress: LOW COMPLEXITY  
  
 
 
 
TREATMENT:  
(In addition to Assessment/Re-Assessment sessions the following treatments were rendered) Pre-treatment Symptoms/Complaints:  \"hello  I want to get up\" Pain: Initial:  
Pain Intensity 1: 0  Post Session:  0 Therapeutic Activity: (    15 minutes): Therapeutic activities including rolling each direction, scooting up in bed, and education on handling of R paretic arm to improve mobility, strength and coordination. Required minimal cues   to promote coordination of bilateral, upper extremity(s), lower extremity(s). Therapeutic Exercise: ( 11 minutes):  Exercises per grid below to improve mobility, strength, balance and coordination. Required moderate  verbal and tactile cues to promote proper body posture. Progressed repetitions and complexity of movement as indicated. Date: 
11/19/18 Date: 
 Date: Activity/Exercise Parameters Parameters Parameters Ankle pumps 30 LAQ 30    
marching 30    
abduction 30 Braces/Orthotics/Lines/Etc:  
· O2 Device: Room air Treatment/Session Assessment:   
· Response to Treatment:  Tolerated well · Interdisciplinary Collaboration:  
o Physical Therapy Assistant 
o Registered Nurse · After treatment position/precautions:  
o Up in chair 
o Bed alarm/tab alert on 
o Bed/Chair-wheels locked 
o Call light within reach 
o RN notified · Compliance with Program/Exercises: Will assess as treatment progresses · Recommendations/Intent for next treatment session: \"Next visit will focus on advancements to more challenging activities and reduction in assistance provided\". Total Treatment Duration: PT Patient Time In/Time Out Time In: 1120 Time Out: 1146 Jorden Campo PTA